# Patient Record
Sex: MALE | Race: WHITE | NOT HISPANIC OR LATINO
[De-identification: names, ages, dates, MRNs, and addresses within clinical notes are randomized per-mention and may not be internally consistent; named-entity substitution may affect disease eponyms.]

---

## 2020-08-31 ENCOUNTER — APPOINTMENT (OUTPATIENT)
Dept: OTOLARYNGOLOGY | Facility: CLINIC | Age: 75
End: 2020-08-31
Payer: MEDICARE

## 2020-08-31 VITALS — HEIGHT: 64 IN | WEIGHT: 202 LBS | BODY MASS INDEX: 34.49 KG/M2 | TEMPERATURE: 97.5 F

## 2020-08-31 DIAGNOSIS — Z86.69 PERSONAL HISTORY OF OTHER DISEASES OF THE NERVOUS SYSTEM AND SENSE ORGANS: ICD-10-CM

## 2020-08-31 DIAGNOSIS — Z78.9 OTHER SPECIFIED HEALTH STATUS: ICD-10-CM

## 2020-08-31 DIAGNOSIS — Z83.3 FAMILY HISTORY OF DIABETES MELLITUS: ICD-10-CM

## 2020-08-31 DIAGNOSIS — H90.5 UNSPECIFIED SENSORINEURAL HEARING LOSS: ICD-10-CM

## 2020-08-31 DIAGNOSIS — Z86.39 PERSONAL HISTORY OF OTHER ENDOCRINE, NUTRITIONAL AND METABOLIC DISEASE: ICD-10-CM

## 2020-08-31 DIAGNOSIS — H93.299 OTHER ABNORMAL AUDITORY PERCEPTIONS, UNSPECIFIED EAR: ICD-10-CM

## 2020-08-31 PROBLEM — Z00.00 ENCOUNTER FOR PREVENTIVE HEALTH EXAMINATION: Status: ACTIVE | Noted: 2020-08-31

## 2020-08-31 PROCEDURE — 99203 OFFICE O/P NEW LOW 30 MIN: CPT

## 2020-08-31 PROCEDURE — 92550 TYMPANOMETRY & REFLEX THRESH: CPT

## 2020-08-31 PROCEDURE — 92557 COMPREHENSIVE HEARING TEST: CPT

## 2020-08-31 NOTE — ASSESSMENT
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 He is medically cleared for amplification. He will have a consultation at our hearing Center.

## 2020-08-31 NOTE — HISTORY OF PRESENT ILLNESS
[de-identified] : Initial visit.\par He presents today for evaluation of a several year history of bilateral gradual hearing loss. His intermittent tinnitus. He does not have a history of significant or chronic ear problems.

## 2020-09-04 ENCOUNTER — APPOINTMENT (OUTPATIENT)
Dept: OTOLARYNGOLOGY | Facility: CLINIC | Age: 75
End: 2020-09-04
Payer: SELF-PAY

## 2020-09-04 PROCEDURE — V5010 ASSESSMENT FOR HEARING AID: CPT | Mod: NC

## 2020-09-13 ENCOUNTER — TRANSCRIPTION ENCOUNTER (OUTPATIENT)
Age: 75
End: 2020-09-13

## 2021-01-11 ENCOUNTER — APPOINTMENT (OUTPATIENT)
Dept: ORTHOPEDIC SURGERY | Facility: CLINIC | Age: 76
End: 2021-01-11
Payer: MEDICARE

## 2021-01-11 VITALS — RESPIRATION RATE: 16 BRPM | WEIGHT: 202 LBS | HEIGHT: 64 IN | BODY MASS INDEX: 34.49 KG/M2

## 2021-01-11 PROCEDURE — 76882 US LMTD JT/FCL EVL NVASC XTR: CPT | Mod: RT

## 2021-01-11 PROCEDURE — 99203 OFFICE O/P NEW LOW 30 MIN: CPT

## 2021-01-11 PROCEDURE — 73110 X-RAY EXAM OF WRIST: CPT | Mod: 50

## 2021-01-20 ENCOUNTER — EMERGENCY (EMERGENCY)
Facility: HOSPITAL | Age: 76
LOS: 1 days | Discharge: HOME | End: 2021-01-20
Admitting: EMERGENCY MEDICINE
Payer: MEDICARE

## 2021-01-20 VITALS
RESPIRATION RATE: 18 BRPM | SYSTOLIC BLOOD PRESSURE: 130 MMHG | TEMPERATURE: 98 F | HEART RATE: 93 BPM | DIASTOLIC BLOOD PRESSURE: 72 MMHG | OXYGEN SATURATION: 96 %

## 2021-01-20 DIAGNOSIS — Z20.822 CONTACT WITH AND (SUSPECTED) EXPOSURE TO COVID-19: ICD-10-CM

## 2021-01-20 DIAGNOSIS — Z90.49 ACQUIRED ABSENCE OF OTHER SPECIFIED PARTS OF DIGESTIVE TRACT: Chronic | ICD-10-CM

## 2021-01-20 PROCEDURE — 99283 EMERGENCY DEPT VISIT LOW MDM: CPT

## 2021-01-20 NOTE — ED PROVIDER NOTE - PHYSICAL EXAMINATION
Gen - WDWN, NAD, speaking in full sentences  Skin - no acute rash, intact   HEENT - AT/NC, no conjunctival injection or dc, neck supple and FROM, airway patent   CV - S1S2, R/R/R  Resp - CTAB, no focal r/r/w   MSK - w/w/p, full ROM of peripheral joints, no midline tenderness   Psych - euphoria, normal speech and eye contact, judgement/insight intact   Neuro - AxOx3, ambulatory with steady gait Gen - WDWN M, NAD, speaking in full sentences  Skin - no acute rash, intact   HEENT - AT/NC, no conjunctival injection or dc, neck supple and FROM, airway patent   CV - S1S2, R/R/R  Resp - CTAB, no focal r/r/w   MSK - w/w/p, full ROM of peripheral joints, no midline tenderness   Psych - euphoria, normal speech and eye contact, judgement/insight intact   Neuro - AxOx3, ambulatory with steady gait

## 2021-01-20 NOTE — ED PROVIDER NOTE - NS ED ROS FT
GEN - no fever, chills, malaise, weight loss   Skin - no rash, lesions, itch  HEENT - no rhinorrhea, congestion, change in vision/hearing/smell, sore throat, change in voice, neck pain  CV - no chest pain, palpitations, syncope  Resp - no cough, SOB, GODDARD, wheezing, hemoptysis  GI - no N/V/D/C  MSK - no swelling, joint pain, swelling, myalgia  Hem - No easy bruising or bleeding  Neuro - no HA, dizziness, loss of smell/taste, focal weakness, paresthesia  Psych - no insomonia or mood changes

## 2021-01-20 NOTE — ED PROVIDER NOTE - OBJECTIVE STATEMENT
74 yo M with PMHx of HTN, presenting for COVID screening.  Reports no active sx but son in law tested positive for COVID yesterday.  Denies fever, chills, cough, SOB, palpitations, HA, dizziness, congestion, rhinorrhea, N/V/D/C, abdomina pain, change in urinary/bowel function, focal weakness, and malaise. No recent travel noted 74 yo M with PMHx of HTN, DM, presenting for COVID screening.  Reports no active sx but son in law tested positive for COVID yesterday.  Denies fever, chills, cough, SOB, palpitations, HA, dizziness, congestion, rhinorrhea, N/V/D/C, abdomina pain, change in urinary/bowel function, focal weakness, and malaise. No recent travel noted

## 2021-01-20 NOTE — ED PROVIDER NOTE - PATIENT PORTAL LINK FT
You can access the FollowMyHealth Patient Portal offered by [facility] by registering at the following website: [email or web address]. By joining [employer]’s FollowMyHealth portal, you will also be able to view your health information using other applications (apps) compatible with our system.

## 2021-01-22 ENCOUNTER — APPOINTMENT (OUTPATIENT)
Dept: PULMONOLOGY | Facility: CLINIC | Age: 76
End: 2021-01-22
Payer: MEDICARE

## 2021-01-22 ENCOUNTER — APPOINTMENT (OUTPATIENT)
Dept: PULMONOLOGY | Facility: CLINIC | Age: 76
End: 2021-01-22

## 2021-01-22 PROBLEM — E11.9 TYPE 2 DIABETES MELLITUS WITHOUT COMPLICATIONS: Chronic | Status: ACTIVE | Noted: 2021-01-20

## 2021-01-22 PROBLEM — I10 ESSENTIAL (PRIMARY) HYPERTENSION: Chronic | Status: ACTIVE | Noted: 2021-01-20

## 2021-01-22 PROBLEM — E78.5 HYPERLIPIDEMIA, UNSPECIFIED: Chronic | Status: ACTIVE | Noted: 2021-01-20

## 2021-01-22 PROCEDURE — 99443: CPT | Mod: CS,95

## 2021-01-22 NOTE — HISTORY OF PRESENT ILLNESS
[Home] : at home, [unfilled] , at the time of the visit. [Medical Office: ([facility])___] : at the medical office located in  [Verbal consent obtained from patient] : the patient, [unfilled] [FreeTextEntry1] : Initial telephonic visit, Covid.  Unfortunately patient was unable to figure out how to do the video\par \par 75-year-old male, history of diabetes, KEARA on CPAP.\par \par Currently Covid day 8 based from symptoms.  Symptom onset was last Friday.  Fatigue, body aches, cough.  Monday seems to have been his worst day.  He did not get tested until Wednesday, came back positive.\par \par Is already feeling significantly better.  He has a pulse ox which is 96% on room air, heart rate 85.  He has no shortness of breath, no dyspnea with exertion.  Using his CPAP at night as always.  He still has some nasal congestion with a postnasal drip cough.  No fevers.  "Too good"\par \par taking a full dose aspirin now (instead of baby), D3, zinc, vit c\par \par on the phone, nasal congestion but no distress\par \par COVID day 8 (at least)\par Already clinically improving.\par At this point, no benefit from MAB referral.\par Continue to monitor sats, let us know if <92 - though i think he will be ok\par f/u over the weekend to chek in\par \par note, wife  of covid in the spring

## 2021-01-23 ENCOUNTER — NON-APPOINTMENT (OUTPATIENT)
Age: 76
End: 2021-01-23

## 2021-01-25 ENCOUNTER — APPOINTMENT (OUTPATIENT)
Dept: OPHTHALMOLOGY | Facility: CLINIC | Age: 76
End: 2021-01-25

## 2021-01-27 ENCOUNTER — APPOINTMENT (OUTPATIENT)
Dept: PULMONOLOGY | Facility: CLINIC | Age: 76
End: 2021-01-27
Payer: MEDICARE

## 2021-01-27 PROCEDURE — 99441: CPT | Mod: CS,95

## 2021-01-27 NOTE — HISTORY OF PRESENT ILLNESS
[Home] : at home, [unfilled] , at the time of the visit. [Medical Office: ([facility])___] : at the medical office located in  [Verbal consent obtained from patient] : the patient, [unfilled] [FreeTextEntry1] : Telephonic follow-up, Covid.\par \par Patient reports he continues to feel well.  He has normal oxygen saturations, afebrile, appetite back to normal.  Feeling well.\par \par Patient is inquiring about when he can get the vaccination.  Also if he is still contagious.\par \par Patient was advised that he has been more than 10 days since his onset of symptoms and he has no fevers or active symptoms, therefore he is no longer considered contagious.  As far as the vaccination, he is technically allowed to get it once he is no longer in isolation, however, I am recommending that patient wait closer to 90 days given the very low risk of reinfection within the first 90 days at this point.  I advised him to consider scheduling for around March

## 2021-03-31 ENCOUNTER — APPOINTMENT (OUTPATIENT)
Dept: OPHTHALMOLOGY | Facility: CLINIC | Age: 76
End: 2021-03-31

## 2021-03-31 ENCOUNTER — NON-APPOINTMENT (OUTPATIENT)
Age: 76
End: 2021-03-31

## 2021-04-01 ENCOUNTER — APPOINTMENT (OUTPATIENT)
Dept: ORTHOPEDIC SURGERY | Facility: CLINIC | Age: 76
End: 2021-04-01
Payer: MEDICARE

## 2021-04-01 VITALS — RESPIRATION RATE: 16 BRPM | BODY MASS INDEX: 34.49 KG/M2 | HEIGHT: 64 IN | WEIGHT: 202 LBS

## 2021-04-01 DIAGNOSIS — R22.31 LOCALIZED SWELLING, MASS AND LUMP, RIGHT UPPER LIMB: ICD-10-CM

## 2021-04-01 DIAGNOSIS — M25.531 PAIN IN RIGHT WRIST: ICD-10-CM

## 2021-04-01 PROCEDURE — 99214 OFFICE O/P EST MOD 30 MIN: CPT | Mod: 25

## 2021-04-01 PROCEDURE — 20612 ASPIRATE/INJ GANGLION CYST: CPT | Mod: RT

## 2021-07-27 ENCOUNTER — RESULT REVIEW (OUTPATIENT)
Age: 76
End: 2021-07-27

## 2021-07-27 ENCOUNTER — APPOINTMENT (OUTPATIENT)
Dept: RADIOLOGY | Facility: CLINIC | Age: 76
End: 2021-07-27

## 2021-07-27 ENCOUNTER — APPOINTMENT (OUTPATIENT)
Dept: PULMONOLOGY | Facility: CLINIC | Age: 76
End: 2021-07-27
Payer: MEDICARE

## 2021-07-27 ENCOUNTER — OUTPATIENT (OUTPATIENT)
Dept: OUTPATIENT SERVICES | Facility: HOSPITAL | Age: 76
LOS: 1 days | End: 2021-07-27
Payer: MEDICARE

## 2021-07-27 VITALS
DIASTOLIC BLOOD PRESSURE: 75 MMHG | TEMPERATURE: 98.8 F | BODY MASS INDEX: 34.15 KG/M2 | WEIGHT: 200 LBS | HEART RATE: 98 BPM | OXYGEN SATURATION: 95 % | HEIGHT: 64 IN | SYSTOLIC BLOOD PRESSURE: 119 MMHG

## 2021-07-27 DIAGNOSIS — Z90.49 ACQUIRED ABSENCE OF OTHER SPECIFIED PARTS OF DIGESTIVE TRACT: Chronic | ICD-10-CM

## 2021-07-27 PROCEDURE — 99204 OFFICE O/P NEW MOD 45 MIN: CPT

## 2021-07-27 PROCEDURE — 71046 X-RAY EXAM CHEST 2 VIEWS: CPT | Mod: 26

## 2021-07-28 NOTE — REVIEW OF SYSTEMS
[Fever] : no fever [Chills] : no chills [Dry Eyes] : no dry eyes [Eye Irritation] : no eye irritation [Nasal Congestion] : nasal congestion [Cough] : cough [Sputum] : no sputum [A.M. Dry Mouth] : no a.m. dry mouth [Chest Discomfort] : no chest discomfort [GERD] : no gerd [TextBox_148] : rest of ROS negative except in HPI

## 2021-07-28 NOTE — DISCUSSION/SUMMARY
[FreeTextEntry1] : 75 year old male with h/o KEARA on CPAP with h/o COVID in 2020 came to clinic with occasional cough.\par \par Review:\par Pulmonary note from past\par \par Plan:\par Cough due to post nasal drip from allergic rhinitis. Flonase nasal spray twice daily. CXR ordered. Patient will follow up after 4-6 weeks of using Flonase nasal spray.

## 2021-07-28 NOTE — HISTORY OF PRESENT ILLNESS
[TextBox_4] : 75 year old male with h/o KEARA on CPAP with h/o COVID in 2020 came to clinic with occasional cough. Cough is dry with occasional throat clearing, no diurnal variation, no relieving or exacerbating factor. Patient c/o occasional nasal congestion. Denies symptoms of GERD. Patient denies chest pain or SOB.

## 2021-07-28 NOTE — PHYSICAL EXAM
[No Acute Distress] : no acute distress [Well Nourished] : well nourished [Normal Oropharynx] : normal oropharynx [II] : Mallampati Class: II [Normal Appearance] : normal appearance [No Neck Mass] : no neck mass [Normal Rate/Rhythm] : normal rate/rhythm [Normal S1, S2] : normal s1, s2 [No Resp Distress] : no resp distress [Clear to Auscultation Bilaterally] : clear to auscultation bilaterally [Normal Gait] : normal gait [No Clubbing] : no clubbing [No Edema] : no edema [Normal Color/ Pigmentation] : normal color/ pigmentation [No Rash] : no rash [No Focal Deficits] : no focal deficits [No Sensory Deficits] : no sensory deficits [Oriented x3] : oriented x3 [Normal Affect] : normal affect

## 2021-09-13 ENCOUNTER — APPOINTMENT (OUTPATIENT)
Dept: PULMONOLOGY | Facility: CLINIC | Age: 76
End: 2021-09-13
Payer: MEDICARE

## 2021-09-13 VITALS
DIASTOLIC BLOOD PRESSURE: 77 MMHG | HEIGHT: 64 IN | SYSTOLIC BLOOD PRESSURE: 120 MMHG | HEART RATE: 105 BPM | WEIGHT: 193 LBS | BODY MASS INDEX: 32.95 KG/M2 | OXYGEN SATURATION: 96 % | TEMPERATURE: 98.5 F

## 2021-09-13 DIAGNOSIS — R05 COUGH: ICD-10-CM

## 2021-09-13 DIAGNOSIS — J30.1 ALLERGIC RHINITIS DUE TO POLLEN: ICD-10-CM

## 2021-09-13 DIAGNOSIS — Z11.59 ENCOUNTER FOR SCREENING FOR OTHER VIRAL DISEASES: ICD-10-CM

## 2021-09-13 PROCEDURE — 99214 OFFICE O/P EST MOD 30 MIN: CPT

## 2021-09-13 NOTE — DISCUSSION/SUMMARY
[FreeTextEntry1] : 75 year old male with h/o KEARA on CPAP with h/o COVID in 2020 came to clinic with occasional cough due to allergic rhinitis (resolved)\par \par Review:\par Pulmonary note from past\par \par Plan:\par Cough was due to post nasal drip from allergic rhinitis. Resolved with use of Flonase nasal spray twice daily. CXR was done during last visit and it was normal. Rest as above.

## 2021-09-13 NOTE — HISTORY OF PRESENT ILLNESS
[TextBox_4] : 75 year old male with h/o KEARA on CPAP with h/o COVID in 2020 came to clinic with occasional cough. Cough is dry with occasional throat clearing, no diurnal variation, no relieving or exacerbating factor. Patient c/o occasional nasal congestion. Denies symptoms of GERD. Patient denies chest pain or SOB. \par \par 9/13/21:\par Cough resolved. Patient is still using flonase nasal spray.

## 2021-09-13 NOTE — REVIEW OF SYSTEMS
[Fever] : no fever [Chills] : no chills [Dry Eyes] : no dry eyes [Eye Irritation] : no eye irritation [Nasal Congestion] : no nasal congestion [Cough] : no cough [Sputum] : no sputum [A.M. Dry Mouth] : no a.m. dry mouth [Chest Discomfort] : no chest discomfort [GERD] : no gerd [TextBox_148] : rest of ROS negative except in HPI

## 2021-09-16 LAB — SARS-COV-2 N GENE NPH QL NAA+PROBE: NOT DETECTED

## 2022-01-24 ENCOUNTER — TRANSCRIPTION ENCOUNTER (OUTPATIENT)
Age: 77
End: 2022-01-24

## 2022-01-31 ENCOUNTER — RX RENEWAL (OUTPATIENT)
Age: 77
End: 2022-01-31

## 2022-04-11 PROBLEM — Z11.59 SCREENING FOR VIRAL DISEASE: Status: ACTIVE | Noted: 2021-09-13

## 2022-05-11 ENCOUNTER — NON-APPOINTMENT (OUTPATIENT)
Age: 77
End: 2022-05-11

## 2022-06-07 ENCOUNTER — APPOINTMENT (OUTPATIENT)
Dept: ORTHOPEDIC SURGERY | Facility: CLINIC | Age: 77
End: 2022-06-07
Payer: MEDICARE

## 2022-06-07 VITALS
BODY MASS INDEX: 32.95 KG/M2 | HEART RATE: 101 BPM | DIASTOLIC BLOOD PRESSURE: 82 MMHG | HEIGHT: 64 IN | WEIGHT: 193 LBS | OXYGEN SATURATION: 97 % | SYSTOLIC BLOOD PRESSURE: 128 MMHG

## 2022-06-07 DIAGNOSIS — M47.816 SPONDYLOSIS W/OUT MYELOPATHY OR RADICULOPATHY, LUMBAR REGION: ICD-10-CM

## 2022-06-07 PROCEDURE — 99204 OFFICE O/P NEW MOD 45 MIN: CPT

## 2022-06-07 PROCEDURE — 72083 X-RAY EXAM ENTIRE SPI 4/5 VW: CPT

## 2022-06-27 PROBLEM — M47.816 LUMBAR SPONDYLOSIS: Status: ACTIVE | Noted: 2022-06-27

## 2022-06-27 NOTE — ADDENDUM
[FreeTextEntry1] : I, Elzbieta Dickey, assisted with documentation on 06/07/2022 acting as scribe for Dr. Marlo Estrella.

## 2022-06-27 NOTE — PHYSICAL EXAM
[de-identified] : General: patient is well developed, well nourished, in no acute \par distress, alert and oriented x 3. \par \par Mood and affect: normal\par \par Respiratory: no respiratory distress noted\par \par Skin: no scars over spine, skin intact, no erythema, increased warmth\par \par Alignment:The spine is well compensated in the coronal and sagittal plane. \par \par Gait: The patient is able to toe walk and heel walk without difficulty. \par \par Palpation: no tenderness to palpation spine or paraspinal region\par \par Range of motion: Lumbar spine ROM is restricted\par \par Neurologic Exam:\par Motor: Manual Muscle testing in the lower extremities is 5 out of 5 in all muscle groups. There is no evidence of muscular atrophy in the lower extremities. Sensory: Sensation to light touch is grossly intact in the lower extremities\par \par Hip Exam: No pain with internal or external rotation of hips bilaterally\par \par Special tests: Straight leg raise test negative. Cross straight leg test negative.  [de-identified] : XR Full Spine with Lumbar flexion/extension 6/7/22 [my read]: there is multilevel disc degeneration and lumbar spondylosis. No fractures seen. No significant scoliosis and appropriate sagittal alignment. No spondylolisthesis seen.

## 2022-06-27 NOTE — HISTORY OF PRESENT ILLNESS
[de-identified] : Initial 6/7/22: This is a 76 year old gentleman who reports low back pain for approximately the past five months, atraumatic onset. Pain localized to his right lumbosacral region. Denies lower extremity radiating pain tonight. Denies lower extremity numbness, weakness, paresthesias. Pain is persistent with periodic exacerbations of pain. Last exacerbation was approximately two weeks ago when he was on a long flight to [place]. Pain resolved with oral steroids.

## 2022-06-27 NOTE — DISCUSSION/SUMMARY
[de-identified] : Diagnosis: lower back pain lumbar disc degeneration lumbar spondylosis.\par \par Patient was given a prescription of Etodolac to take for one month twice a day with food. He was also given a prescription for a muscle relaxant as needed. He was given a prescription for physical therapy. I discussed with him that I would like to see him back in three months if needed.  Detail Level: Generalized X Size Of Lesion In Cm (Optional): 0

## 2022-07-06 ENCOUNTER — RX RENEWAL (OUTPATIENT)
Age: 77
End: 2022-07-06

## 2022-07-21 ENCOUNTER — NON-APPOINTMENT (OUTPATIENT)
Age: 77
End: 2022-07-21

## 2022-09-13 ENCOUNTER — APPOINTMENT (OUTPATIENT)
Dept: ORTHOPEDIC SURGERY | Facility: CLINIC | Age: 77
End: 2022-09-13

## 2022-09-13 DIAGNOSIS — M51.36 OTHER INTERVERTEBRAL DISC DEGENERATION, LUMBAR REGION: ICD-10-CM

## 2022-09-13 DIAGNOSIS — G89.29 LOW BACK PAIN, UNSPECIFIED: ICD-10-CM

## 2022-09-13 DIAGNOSIS — M54.50 LOW BACK PAIN, UNSPECIFIED: ICD-10-CM

## 2022-09-13 PROCEDURE — 99213 OFFICE O/P EST LOW 20 MIN: CPT

## 2022-09-23 PROBLEM — M51.36 DISC DEGENERATION, LUMBAR: Status: ACTIVE | Noted: 2022-06-27

## 2022-09-23 PROBLEM — M54.50 CHRONIC RIGHT-SIDED LOW BACK PAIN WITHOUT SCIATICA: Status: ACTIVE | Noted: 2022-06-07

## 2022-09-27 NOTE — HISTORY OF PRESENT ILLNESS
[de-identified] : Follow up 9/13/22: Patient presents for follow up. He states he took etodolac and cyclobenzaprine as prescribed and reports significant improvement, almost coplete resolution of his low back pain since last visit. Denies lower extremity radiating pain, denies lower extremity numbness/weakness/paresthesias. Patient states is doing very well. \par \par Initial 6/7/22: This is a 76 year old gentleman who reports low back pain for approximately the past five months, atraumatic onset. Pain localized to his right lumbosacral region. Denies lower extremity radiating pain tonight. Denies lower extremity numbness, weakness, paresthesias. Pain is persistent with periodic exacerbations of pain. Last exacerbation was approximately two weeks ago when he was on a long flight to [place]. Pain resolved with oral steroids. \par

## 2022-09-27 NOTE — DISCUSSION/SUMMARY
[de-identified] : Diagnosis: chronic low back pain, lumbar disc degeneration, lumbar spondylosis\par \par Discussed my findings with the patient. As patient is neurologically intact and symptoms have almost completely resolved, no indication for further work up with advanced imaging or treatment at this time. Patient can take oral NSAIDs as needed. He will follow up with me on an as needed basis. All questions answered.

## 2022-09-27 NOTE — PHYSICAL EXAM
[de-identified] : General: patient is well developed, well nourished, in no acute \par distress, alert and oriented x 3. \par \par Mood and affect: normal\par \par Respiratory: no respiratory distress noted\par \par Skin: no scars over spine, skin intact, no erythema, increased warmth\par \par Alignment:The spine is well compensated in the coronal and sagittal plane. \par \par Gait: The patient is able to toe walk and heel walk without difficulty. \par \par Palpation: no tenderness to palpation spine or paraspinal region\par \par Range of motion: Lumbar spine ROM is restricted\par \par Neurologic Exam:\par Motor: Manual Muscle testing in the lower extremities is 5 out of 5 in all muscle groups. There is no evidence of muscular atrophy in the lower extremities. Sensory: Sensation to light touch is grossly intact in the lower extremities\par \par Hip Exam: No pain with internal or external rotation of hips bilaterally\par \par Special tests: Straight leg raise test negative. Cross straight leg test negative.  [de-identified] : XR Full Spine with Lumbar flexion/extension 6/7/22 [my read]: there is multilevel disc degeneration and lumbar spondylosis. No fractures seen. No significant scoliosis and appropriate sagittal alignment. No spondylolisthesis seen. \par \par

## 2022-09-30 ENCOUNTER — RX RENEWAL (OUTPATIENT)
Age: 77
End: 2022-09-30

## 2022-11-13 ENCOUNTER — NON-APPOINTMENT (OUTPATIENT)
Age: 77
End: 2022-11-13

## 2022-11-15 ENCOUNTER — NON-APPOINTMENT (OUTPATIENT)
Age: 77
End: 2022-11-15

## 2022-11-15 ENCOUNTER — APPOINTMENT (OUTPATIENT)
Dept: OPHTHALMOLOGY | Facility: CLINIC | Age: 77
End: 2022-11-15

## 2022-12-22 ENCOUNTER — NON-APPOINTMENT (OUTPATIENT)
Age: 77
End: 2022-12-22

## 2023-01-19 ENCOUNTER — NON-APPOINTMENT (OUTPATIENT)
Age: 78
End: 2023-01-19

## 2023-02-23 ENCOUNTER — NON-APPOINTMENT (OUTPATIENT)
Age: 78
End: 2023-02-23

## 2023-04-06 ENCOUNTER — RX RENEWAL (OUTPATIENT)
Age: 78
End: 2023-04-06

## 2023-06-21 ENCOUNTER — APPOINTMENT (OUTPATIENT)
Dept: OPHTHALMOLOGY | Facility: CLINIC | Age: 78
End: 2023-06-21

## 2023-06-21 ENCOUNTER — NON-APPOINTMENT (OUTPATIENT)
Age: 78
End: 2023-06-21

## 2023-11-09 RX ORDER — FLUTICASONE PROPIONATE 50 UG/1
50 SPRAY, METERED NASAL
Qty: 16 | Refills: 5 | Status: ACTIVE | COMMUNITY
Start: 2021-07-27 | End: 1900-01-01

## 2023-12-21 ENCOUNTER — NON-APPOINTMENT (OUTPATIENT)
Age: 78
End: 2023-12-21

## 2023-12-21 ENCOUNTER — APPOINTMENT (OUTPATIENT)
Dept: OPHTHALMOLOGY | Facility: CLINIC | Age: 78
End: 2023-12-21

## 2024-02-05 ENCOUNTER — APPOINTMENT (OUTPATIENT)
Dept: PULMONOLOGY | Facility: CLINIC | Age: 79
End: 2024-02-05
Payer: MEDICARE

## 2024-02-05 VITALS
WEIGHT: 200 LBS | DIASTOLIC BLOOD PRESSURE: 78 MMHG | HEIGHT: 64 IN | SYSTOLIC BLOOD PRESSURE: 144 MMHG | OXYGEN SATURATION: 96 % | BODY MASS INDEX: 34.15 KG/M2 | TEMPERATURE: 97.7 F | HEART RATE: 87 BPM

## 2024-02-05 DIAGNOSIS — R06.2 WHEEZING: ICD-10-CM

## 2024-02-05 DIAGNOSIS — G47.33 OBSTRUCTIVE SLEEP APNEA (ADULT) (PEDIATRIC): ICD-10-CM

## 2024-02-05 PROCEDURE — 99203 OFFICE O/P NEW LOW 30 MIN: CPT | Mod: 25

## 2024-02-05 PROCEDURE — 94618 PULMONARY STRESS TESTING: CPT

## 2024-02-05 PROCEDURE — ZZZZZ: CPT

## 2024-02-05 PROCEDURE — 94729 DIFFUSING CAPACITY: CPT

## 2024-02-05 PROCEDURE — 94726 PLETHYSMOGRAPHY LUNG VOLUMES: CPT

## 2024-02-05 PROCEDURE — 94060 EVALUATION OF WHEEZING: CPT

## 2024-02-05 NOTE — PHYSICAL EXAM
[No Acute Distress] : no acute distress [No Resp Distress] : no resp distress [Clear to Auscultation Bilaterally] : clear to auscultation bilaterally [Normal Gait] : normal gait [Oriented x3] : oriented x3 [Normal Affect] : normal affect [Normal Appearance] : normal appearance [Normal Rate/Rhythm] : normal rate/rhythm [No Acc Muscle Use] : no acc muscle use

## 2024-02-05 NOTE — HISTORY OF PRESENT ILLNESS
[Former] : former [>= 20 pack years] : >= 20 pack years [Never] : never [TextBox_4] : 75 year old male with h/o KEARA on CPAP with h/o COVID in 2020 came to clinic with occasional cough. Cough is dry with occasional throat clearing, no diurnal variation, no relieving or exacerbating factor. Patient c/o occasional nasal congestion. Denies symptoms of GERD. Patient denies chest pain or SOB.  9/13/21: Cough resolved. Patient is still using flonase nasal spray.  2/5/2024 Cough not an issue. Does have some wheezing. Feels chest tightness. Unclear when present. Smoked at least 20 pack years but quit in the 1980s. Does have KEARA and uses PAP intermittently.  [YearQuit] : 1980s [ESS] : 3

## 2024-02-05 NOTE — ASSESSMENT
[FreeTextEntry1] : Review: Pulmonary note from past PFTs 2/5/2024: FEV1/FVC 67, FEV1 70, TLCO 71, DLCO 70 6MWT 2/5/2024: no desat; 457 meters walked  78 year old male with h/o KEARA on CPAP with h/o COVID in 2020 came to clinic with occasional cough due to allergic rhinitis (resolved) here for wheezing. Unclear etiology of wheezing. Does have hx of smoking. Endorses significant chest pressure as well. PFTs today with restriction primarily, mild obstruction.   Plan: -Trial LAMA/LABA -Encouraged weight loss -Cardiology evaluation for significant chest tightness -Send me PAP info so I can link accounts -Follow up in 2 months

## 2024-02-05 NOTE — CONSULT LETTER
[Dear  ___] : Dear  [unfilled], [Consult Letter:] : I had the pleasure of evaluating your patient, [unfilled]. [Please see my note below.] : Please see my note below. [Consult Closing:] : Thank you very much for allowing me to participate in the care of this patient.  If you have any questions, please do not hesitate to contact me. [Sincerely,] : Sincerely, [FreeTextEntry3] : Betty Smiley MD  [employer] [facility] Pulmonary, Critical Care, and Sleep Medicine

## 2024-02-06 DIAGNOSIS — J44.9 CHRONIC OBSTRUCTIVE PULMONARY DISEASE, UNSPECIFIED: ICD-10-CM

## 2024-02-14 ENCOUNTER — NON-APPOINTMENT (OUTPATIENT)
Age: 79
End: 2024-02-14

## 2024-02-14 ENCOUNTER — APPOINTMENT (OUTPATIENT)
Dept: HEART AND VASCULAR | Facility: CLINIC | Age: 79
End: 2024-02-14
Payer: MEDICARE

## 2024-02-14 VITALS
DIASTOLIC BLOOD PRESSURE: 81 MMHG | WEIGHT: 200.38 LBS | HEART RATE: 100 BPM | SYSTOLIC BLOOD PRESSURE: 145 MMHG | HEIGHT: 64 IN | TEMPERATURE: 98.2 F | BODY MASS INDEX: 34.21 KG/M2 | OXYGEN SATURATION: 96 %

## 2024-02-14 DIAGNOSIS — R07.89 OTHER CHEST PAIN: ICD-10-CM

## 2024-02-14 PROCEDURE — G2211 COMPLEX E/M VISIT ADD ON: CPT

## 2024-02-14 PROCEDURE — 99203 OFFICE O/P NEW LOW 30 MIN: CPT

## 2024-02-14 PROCEDURE — 93000 ELECTROCARDIOGRAM COMPLETE: CPT

## 2024-02-14 NOTE — REASON FOR VISIT
Quality 110: Preventive Care And Screening: Influenza Immunization: Influenza Immunization Administered during Influenza season Detail Level: Detailed Quality 226: Preventive Care And Screening: Tobacco Use: Screening And Cessation Intervention: Patient screened for tobacco use and is an ex/non-smoker Quality 130: Documentation Of Current Medications In The Medical Record: Current Medications Documented [Symptom and Test Evaluation] : symptom and test evaluation [FreeTextEntry1] :   CV Data: ECG 2/14/24: sinus tachy, inferior Q waves

## 2024-02-14 NOTE — HISTORY OF PRESENT ILLNESS
[FreeTextEntry1] : 78M w KEAAR, HTN, preDMT2, BPH  - on olmesartan, farxiga, asa 81 (rx by pcp) - anoro recently prescribed - does report chest pressures but still has good exercise capacity  - walked a mile to office today, no issues

## 2024-02-15 DIAGNOSIS — R79.89 OTHER SPECIFIED ABNORMAL FINDINGS OF BLOOD CHEMISTRY: ICD-10-CM

## 2024-02-15 LAB
ALBUMIN SERPL ELPH-MCNC: 4.5 G/DL
ALP BLD-CCNC: 81 U/L
ALT SERPL-CCNC: 13 U/L
ANION GAP SERPL CALC-SCNC: 14 MMOL/L
AST SERPL-CCNC: 12 U/L
BILIRUB SERPL-MCNC: 0.6 MG/DL
BUN SERPL-MCNC: 30 MG/DL
CALCIUM SERPL-MCNC: 8.9 MG/DL
CHLORIDE SERPL-SCNC: 102 MMOL/L
CHOLEST SERPL-MCNC: 191 MG/DL
CO2 SERPL-SCNC: 21 MMOL/L
CREAT SERPL-MCNC: 1.45 MG/DL
EGFR: 50 ML/MIN/1.73M2
GLUCOSE SERPL-MCNC: 441 MG/DL
HCT VFR BLD CALC: 44 %
HDLC SERPL-MCNC: 52 MG/DL
HGB BLD-MCNC: 13.9 G/DL
LDLC SERPL CALC-MCNC: 106 MG/DL
MCHC RBC-ENTMCNC: 28.2 PG
MCHC RBC-ENTMCNC: 31.6 GM/DL
MCV RBC AUTO: 89.2 FL
NONHDLC SERPL-MCNC: 140 MG/DL
PLATELET # BLD AUTO: 135 K/UL
POTASSIUM SERPL-SCNC: 4.8 MMOL/L
PROT SERPL-MCNC: 7.2 G/DL
RBC # BLD: 4.93 M/UL
RBC # FLD: 13.2 %
SODIUM SERPL-SCNC: 137 MMOL/L
TRIGL SERPL-MCNC: 193 MG/DL
WBC # FLD AUTO: 5.21 K/UL

## 2024-02-21 ENCOUNTER — APPOINTMENT (OUTPATIENT)
Dept: HEART AND VASCULAR | Facility: CLINIC | Age: 79
End: 2024-02-21
Payer: MEDICARE

## 2024-02-21 PROCEDURE — 36415 COLL VENOUS BLD VENIPUNCTURE: CPT

## 2024-02-22 LAB
ANION GAP SERPL CALC-SCNC: 18 MMOL/L
BUN SERPL-MCNC: 18 MG/DL
CALCIUM SERPL-MCNC: 9.7 MG/DL
CHLORIDE SERPL-SCNC: 102 MMOL/L
CO2 SERPL-SCNC: 18 MMOL/L
CREAT SERPL-MCNC: 1.21 MG/DL
EGFR: 62 ML/MIN/1.73M2
GLUCOSE SERPL-MCNC: 219 MG/DL
POTASSIUM SERPL-SCNC: 4.4 MMOL/L
SODIUM SERPL-SCNC: 139 MMOL/L

## 2024-02-26 ENCOUNTER — RESULT REVIEW (OUTPATIENT)
Age: 79
End: 2024-02-26

## 2024-02-26 ENCOUNTER — APPOINTMENT (OUTPATIENT)
Dept: CT IMAGING | Facility: CLINIC | Age: 79
End: 2024-02-26
Payer: MEDICARE

## 2024-02-26 ENCOUNTER — OUTPATIENT (OUTPATIENT)
Dept: OUTPATIENT SERVICES | Facility: HOSPITAL | Age: 79
LOS: 1 days | End: 2024-02-26

## 2024-02-26 DIAGNOSIS — Z90.49 ACQUIRED ABSENCE OF OTHER SPECIFIED PARTS OF DIGESTIVE TRACT: Chronic | ICD-10-CM

## 2024-02-26 PROCEDURE — 75574 CT ANGIO HRT W/3D IMAGE: CPT | Mod: 26,MH

## 2024-02-26 PROCEDURE — 75580 N-INVAS EST C FFR SW ALY CTA: CPT | Mod: 26

## 2024-03-04 ENCOUNTER — RX RENEWAL (OUTPATIENT)
Age: 79
End: 2024-03-04

## 2024-03-08 ENCOUNTER — APPOINTMENT (OUTPATIENT)
Dept: HEART AND VASCULAR | Facility: CLINIC | Age: 79
End: 2024-03-08
Payer: MEDICARE

## 2024-03-08 VITALS
DIASTOLIC BLOOD PRESSURE: 87 MMHG | BODY MASS INDEX: 32.78 KG/M2 | OXYGEN SATURATION: 93 % | WEIGHT: 192 LBS | HEIGHT: 64 IN | SYSTOLIC BLOOD PRESSURE: 145 MMHG | TEMPERATURE: 97.6 F | HEART RATE: 100 BPM

## 2024-03-08 PROCEDURE — G2211 COMPLEX E/M VISIT ADD ON: CPT

## 2024-03-08 PROCEDURE — 99215 OFFICE O/P EST HI 40 MIN: CPT

## 2024-03-08 NOTE — HISTORY OF PRESENT ILLNESS
[FreeTextEntry1] : 78M w KEARA, HTN, preDMT2, BPH who was referred by Dr Smiley for chest tightness. Work up performed include a CCTA with significant CAD as noted above Today he follows up noting he has a chest cold. He is still able to function reasonably and reports not having any significant impairment in his day-to-day activities. - Clearly denies GODDARD, or exertional chest pain. - never been on statins before

## 2024-03-08 NOTE — PHYSICAL EXAM
[Well Nourished] : well nourished [Well Developed] : well developed [No Acute Distress] : no acute distress [Normal Conjunctiva] : normal conjunctiva [Normal Venous Pressure] : normal venous pressure [No Carotid Bruit] : no carotid bruit [Normal S1, S2] : normal S1, S2 [No Murmur] : no murmur [No Rub] : no rub [No Gallop] : no gallop [Good Air Entry] : good air entry [Clear Lung Fields] : clear lung fields [No Respiratory Distress] : no respiratory distress  [Soft] : abdomen soft [Non Tender] : non-tender [No Masses/organomegaly] : no masses/organomegaly [Normal Bowel Sounds] : normal bowel sounds [Normal Gait] : normal gait [No Edema] : no edema [No Cyanosis] : no cyanosis [No Varicosities] : no varicosities [No Clubbing] : no clubbing [No Rash] : no rash [No Skin Lesions] : no skin lesions [Moves all extremities] : moves all extremities [No Focal Deficits] : no focal deficits [Normal Speech] : normal speech [Alert and Oriented] : alert and oriented [Normal memory] : normal memory [de-identified] : tachycardic PAST MEDICAL HISTORY:  Asthma     Depression     Diabetes     H/O constipation     Hyperlipidemia

## 2024-03-08 NOTE — REASON FOR VISIT
[Symptom and Test Evaluation] : symptom and test evaluation [FreeTextEntry1] :   CV Data: ECG 2/14/24: sinus tachy, inferior Q waves  CCTA 2/26/24:  1.  The calcium score is severe at 988 Agatston units, which is at the 77 percentile, adjusted for age, gender and race. 2.  Severe stenosis of mid LAD, distal LAD, distal RCA, and RPL. 3.  Moderate to severe stenosis of proximal RCA. 4.  Moderate stenosis of proximal LCx, mid RCA and D1. 5.  Calcific plaque obscures the lumen of OM1, and RPDA, precluding stenosis evaluation. 6.  Remaining coronary segments are non-obstructive.  The results of the CT-FFR analysis are: LAD: 0.72 mid (3); 0.68 distal (3); 0.84 D1 (1) LCx: 0.91 proximal (1); OM1 is not modeled due to small caliber. RCA: 0.94 proximal (1); 0.94 mid (1); 0.94 distal (1); 0.68 RPL (3); RPDA is not modeled due to small caliber.

## 2024-03-08 NOTE — END OF VISIT
Follow-up with your primary care physician in one week if symptoms have not improved or symptoms are starting to get worse.  Take over-the-counter Flonase 1 spray in each nostril twice a day for the next 30 days.  Steam inhalation will be helpful, nightly humidifier will also be helpful.  Take Tylenol and Motrin for fever and pain.  Increase fluids, keep well-hydrated.  You can also use over-the-counter antihistamine allergy medicines this would include Zyrtec, Claritin, or Allegra.  Please choose 1 of these and take it daily.  Please take and finish the full course of the antibiotics.   [Time Spent: ___ minutes] : I have spent [unfilled] minutes of time on the encounter.

## 2024-03-11 LAB
APTT BLD: 28.9 SEC
INR PPP: 0.87 RATIO
PT BLD: 9.9 SEC

## 2024-03-12 ENCOUNTER — RX RENEWAL (OUTPATIENT)
Age: 79
End: 2024-03-12

## 2024-03-13 VITALS
HEIGHT: 64 IN | TEMPERATURE: 98 F | RESPIRATION RATE: 16 BRPM | WEIGHT: 190.04 LBS | DIASTOLIC BLOOD PRESSURE: 70 MMHG | HEART RATE: 87 BPM | SYSTOLIC BLOOD PRESSURE: 117 MMHG | OXYGEN SATURATION: 99 %

## 2024-03-13 RX ORDER — CHLORHEXIDINE GLUCONATE 213 G/1000ML
1 SOLUTION TOPICAL ONCE
Refills: 0 | Status: DISCONTINUED | OUTPATIENT
Start: 2024-03-18 | End: 2024-03-19

## 2024-03-13 NOTE — H&P ADULT - ASSESSMENT
77 yo M, former smoker (20 years), with PMHx HTN, HLD, DM2, CAD (per CCTA severe FFR+ LAD and RPL w/ borderline severe LCX FFR 0.91), CKD (outpatient sCr in allscripts 1.2), thrombocytopenia ( 2/2024) COPD, KEARA who in light of risk factors, symptoms consisting of chest tightness, and abnormal CCTA w/ FFR positive disease, pt now presents for cardiac catheterization with possible intervention if clinically indicated.     EKG: 			  ASA II	Mallampati class: II  Anginal Class: II     -No Known Allergies    -H/H = 15.0/44.8  -Pt denies BRBPR, hematuria, hematochezia, melena. Pt endorses compliance w/ home ASA, stating last dose was 3/17/24. Pt loaded w/ ASA 81 mg x1 and Plavix 600 mg x1  -BUN/Cr =   -EF uknonwn. Euvolemic on exam. IV NS @ 250cc bolus followed by 75 cc/hr x 2 hrs started pre procedure    Sedation Plan:   Moderate  Patient Is Suitable Candidate For Sedation?     Yes    Risks & benefits of procedure and alternative therapy have been explained to the patient by the Interventional Cardiology Fellow including but not limited to: allergic reaction, bleeding with possible need for blood transfusion, infection, renal and vascular compromise, limb damage, arrhythmia, stroke, vessel dissection/perforation, myocardial infarction, and emergent CABG. Informed consent obtained at bedside and included in chart.     79 yo M, former smoker (20 years), with PMHx HTN, HLD, DM2, CAD (per CCTA severe FFR+ LAD and RPL w/ borderline severe LCX FFR 0.91), CKD (outpatient sCr in allscripts 1.2), thrombocytopenia ( 2/2024) COPD, KEARA who in light of risk factors, symptoms consisting of chest tightness, and abnormal CCTA w/ FFR positive disease, pt now presents for cardiac catheterization with possible intervention if clinically indicated.     EKG: 			  ASA II	Mallampati class: II  Anginal Class: II     -No Known Allergies    -H/H = 15.0/44.8  -Pt denies BRBPR, hematuria, hematochezia, melena. Pt endorses compliance w/ home ASA, stating last dose was 3/17/24. Pt loaded w/ ASA 81 mg x1 and Plavix 600 mg x1  -BUN/Cr = 36/1.34   -EF uknonwn. Euvolemic on exam. IV NS @ 250cc bolus followed by 75 cc/hr x 2 hrs started pre procedure    Sedation Plan:   Moderate  Patient Is Suitable Candidate For Sedation?     Yes    Risks & benefits of procedure and alternative therapy have been explained to the patient by the Interventional Cardiology Fellow including but not limited to: allergic reaction, bleeding with possible need for blood transfusion, infection, renal and vascular compromise, limb damage, arrhythmia, stroke, vessel dissection/perforation, myocardial infarction, and emergent CABG. Informed consent obtained at bedside and included in chart.

## 2024-03-13 NOTE — H&P ADULT - NSHPLABSRESULTS_GEN_ALL_CORE
15.0   7.22  )-----------( 154      ( 18 Mar 2024 06:51 )             44.8       03-18    137  |  103  |  36<H>  ----------------------------<  290<H>  4.7   |  19<L>  |  1.34<H>    Ca    10.0      18 Mar 2024 06:51  Mg     2.6     03-18    TPro  7.9  /  Alb  4.9  /  TBili  0.6  /  DBili  x   /  AST  16  /  ALT  12  /  AlkPhos  87  03-18      PT/INR - ( 18 Mar 2024 06:51 )   PT: 10.6 sec;   INR: 0.93          PTT - ( 18 Mar 2024 06:51 )  PTT:28.5 sec    CARDIAC MARKERS ( 18 Mar 2024 06:51 )  x     / x     / 64 U/L / x     / 1.4 ng/mL        Urinalysis Basic - ( 18 Mar 2024 06:51 )    Color: x / Appearance: x / SG: x / pH: x  Gluc: 290 mg/dL / Ketone: x  / Bili: x / Urobili: x   Blood: x / Protein: x / Nitrite: x   Leuk Esterase: x / RBC: x / WBC x   Sq Epi: x / Non Sq Epi: x / Bacteria: x        EKG: NSR 87 BPM with TWI Lead III.

## 2024-03-13 NOTE — H&P ADULT - HISTORY OF PRESENT ILLNESS
Pharmacy: _______  Cardiologist: _______   Escort: _______    Ordered for ASA _____ mg / Plavix ______ mg, NS ______ ml bolus and ______ ml/hr x 2 hours maintenance    79 yo M, ______ smoker, with PMHx HTN, pre-DM2, CAD (per CCTA severe FFR+ LAD and RPL w/ bordeline severe LCX), CKD (baseline ____), COPD, KEARA, presented to Dr Murray with c/o _____. Patient otherwise denies ______ CP, SOB, dizziness, palpitations, orthopnea/PND, leg swelling, LOC, bleeding, melena/hematochezia, fever, chills, URI symptoms, or recent illness.     Past Testing:  -   -     In light of risk factors, CCS class ____ anginal / anginal-equivalent symptoms and abnormal ____, pt now presents for cardiac catheterization with possible intervention if clinically indicated.      Meds: ______________________  Pharmacy: _______  Cardiologist: Dr Murray  Escort: _______    Ordered for ASA 81mg / Plavix 600 mg,  ml bolus and 75 ml/hr x 2 hours maintenance    77 yo M, former smoker (20 years), with PMHx HTN, HLD, DM2, CAD (per CCTA severe FFR+ LAD and RPL w/ borderline severe LCX FFR 0.91), CKD (outpatient BMP in allscripts 1.2), thrombocytopenia ( 2/2024) COPD, KEARA, presented to Dr Murray with c/o chest tightness, however denies GODDARD or exertional CP as well as dizziness, palpitations, orthopnea/PND, leg swelling, LOC, bleeding, melena/hematochezia. He recently had a URI but is now asymptomatic _____.    Past Testing:  - CCTA 2/26/24: Ca score 998 (77%)m, w. FFR positive mLAD (0.72), dLAD (0.68), D1 (0.84), and RPL (0.68), w/ remaining segments FFR negative (pLCX 0.91, p-dRCA 0.94, RPDA and OM1 not visualized as small caliber vessels).    In light of risk factors, symptoms consisting of chest tightness, and abnormal CCTA w/ FFR positive disease, pt now presents for cardiac catheterization with possible intervention if clinically indicated.   Pharmacy: [facility] ([phone])   Cardiologist: Dr Murray  Escort: Daughter       77 yo M, former smoker (20 years), with PMHx HTN, HLD, DM2, CAD (per CCTA severe FFR+ LAD and RPL w/ borderline severe LCX FFR 0.91), CKD (outpatient sCr in allscripts 1.2), thrombocytopenia ( 2/2024) COPD, KEARA, presented to Dr Murray with c/o chest tightness, however denies GODDARD or exertional CP as well as dizziness, palpitations, orthopnea/PND, leg swelling, LOC, bleeding, melena/hematochezia. He recently had a URI but is now asymptomatic.    Past Testing:  - CCTA 2/26/24: Ca score 998 (77%)m, w. FFR positive mLAD (0.72), dLAD (0.68), D1 (0.84), and RPL (0.68), w/ remaining segments FFR negative (pLCX 0.91, p-dRCA 0.94, RPDA and OM1 not visualized as small caliber vessels).    In light of risk factors, symptoms consisting of chest tightness, and abnormal CCTA w/ FFR positive disease, pt now presents for cardiac catheterization with possible intervention if clinically indicated.

## 2024-03-18 ENCOUNTER — INPATIENT (INPATIENT)
Facility: HOSPITAL | Age: 79
LOS: 0 days | Discharge: HOME | DRG: 322 | End: 2024-03-19
Attending: INTERNAL MEDICINE | Admitting: INTERNAL MEDICINE
Payer: COMMERCIAL

## 2024-03-18 ENCOUNTER — RESULT REVIEW (OUTPATIENT)
Age: 79
End: 2024-03-18

## 2024-03-18 ENCOUNTER — TRANSCRIPTION ENCOUNTER (OUTPATIENT)
Age: 79
End: 2024-03-18

## 2024-03-18 DIAGNOSIS — Z90.49 ACQUIRED ABSENCE OF OTHER SPECIFIED PARTS OF DIGESTIVE TRACT: Chronic | ICD-10-CM

## 2024-03-18 LAB
A1C WITH ESTIMATED AVERAGE GLUCOSE RESULT: 11.4 % — HIGH (ref 4–5.6)
ALBUMIN SERPL ELPH-MCNC: 4.9 G/DL — SIGNIFICANT CHANGE UP (ref 3.3–5)
ALP SERPL-CCNC: 87 U/L — SIGNIFICANT CHANGE UP (ref 40–120)
ALT FLD-CCNC: 12 U/L — SIGNIFICANT CHANGE UP (ref 10–45)
ANION GAP SERPL CALC-SCNC: 15 MMOL/L — SIGNIFICANT CHANGE UP (ref 5–17)
APTT BLD: 28.5 SEC — SIGNIFICANT CHANGE UP (ref 24.5–35.6)
AST SERPL-CCNC: 16 U/L — SIGNIFICANT CHANGE UP (ref 10–40)
BASOPHILS # BLD AUTO: 0.07 K/UL — SIGNIFICANT CHANGE UP (ref 0–0.2)
BASOPHILS NFR BLD AUTO: 1 % — SIGNIFICANT CHANGE UP (ref 0–2)
BILIRUB SERPL-MCNC: 0.6 MG/DL — SIGNIFICANT CHANGE UP (ref 0.2–1.2)
BLOOD GAS VENOUS - BLOOD UREA NITROGEN: 33 MG/DL — HIGH (ref 7–23)
BLOOD GAS VENOUS - CREATININE: 1.8 MG/DL — HIGH (ref 0.5–1.3)
BUN SERPL-MCNC: 36 MG/DL — HIGH (ref 7–23)
CA-I SERPL-SCNC: 1.24 MMOL/L — SIGNIFICANT CHANGE UP (ref 1.15–1.33)
CALCIUM SERPL-MCNC: 10 MG/DL — SIGNIFICANT CHANGE UP (ref 8.4–10.5)
CHLORIDE BLDV-SCNC: 105 MMOL/L — SIGNIFICANT CHANGE UP (ref 96–108)
CHLORIDE SERPL-SCNC: 103 MMOL/L — SIGNIFICANT CHANGE UP (ref 96–108)
CHOLEST SERPL-MCNC: 136 MG/DL — SIGNIFICANT CHANGE UP
CK MB CFR SERPL CALC: 1.4 NG/ML — SIGNIFICANT CHANGE UP (ref 0–6.7)
CK SERPL-CCNC: 64 U/L — SIGNIFICANT CHANGE UP (ref 30–200)
CO2 BLDV-SCNC: 18.2 MMOL/L — LOW (ref 22–26)
CO2 SERPL-SCNC: 19 MMOL/L — LOW (ref 22–31)
CREAT SERPL-MCNC: 1.34 MG/DL — HIGH (ref 0.5–1.3)
EGFR: 55 ML/MIN/1.73M2 — LOW
EOSINOPHIL # BLD AUTO: 0.24 K/UL — SIGNIFICANT CHANGE UP (ref 0–0.5)
EOSINOPHIL NFR BLD AUTO: 3.3 % — SIGNIFICANT CHANGE UP (ref 0–6)
ESTIMATED AVERAGE GLUCOSE: 280 MG/DL — HIGH (ref 68–114)
GAS PNL BLDV: 133 MMOL/L — LOW (ref 136–145)
GLUCOSE BLDC GLUCOMTR-MCNC: 236 MG/DL — HIGH (ref 70–99)
GLUCOSE BLDC GLUCOMTR-MCNC: 241 MG/DL — HIGH (ref 70–99)
GLUCOSE BLDC GLUCOMTR-MCNC: 246 MG/DL — HIGH (ref 70–99)
GLUCOSE BLDC GLUCOMTR-MCNC: 318 MG/DL — HIGH (ref 70–99)
GLUCOSE BLDV-MCNC: 312 MG/DL — HIGH (ref 70–99)
GLUCOSE SERPL-MCNC: 290 MG/DL — HIGH (ref 70–99)
HCT VFR BLD CALC: 44.8 % — SIGNIFICANT CHANGE UP (ref 39–50)
HCT VFR BLDA CALC: 47 % — SIGNIFICANT CHANGE UP
HDLC SERPL-MCNC: 46 MG/DL — SIGNIFICANT CHANGE UP
HGB BLD-MCNC: 15 G/DL — SIGNIFICANT CHANGE UP (ref 13–17)
IMM GRANULOCYTES NFR BLD AUTO: 0.4 % — SIGNIFICANT CHANGE UP (ref 0–0.9)
INR BLD: 0.93 — SIGNIFICANT CHANGE UP (ref 0.85–1.18)
ISTAT ACTK (ACTIVATED CLOTTING TIME KAOLIN): 358 SEC — HIGH (ref 74–137)
ISTAT INR: 1.3 — HIGH (ref 0.88–1.16)
ISTAT PT: 15 SEC — HIGH (ref 10–12.9)
LACTATE BLDV-MCNC: 1.3 MMOL/L — SIGNIFICANT CHANGE UP (ref 0.5–2)
LIPID PNL WITH DIRECT LDL SERPL: 65 MG/DL — SIGNIFICANT CHANGE UP
LYMPHOCYTES # BLD AUTO: 2.39 K/UL — SIGNIFICANT CHANGE UP (ref 1–3.3)
LYMPHOCYTES # BLD AUTO: 33.1 % — SIGNIFICANT CHANGE UP (ref 13–44)
MAGNESIUM SERPL-MCNC: 2.6 MG/DL — SIGNIFICANT CHANGE UP (ref 1.6–2.6)
MCHC RBC-ENTMCNC: 28.5 PG — SIGNIFICANT CHANGE UP (ref 27–34)
MCHC RBC-ENTMCNC: 33.5 GM/DL — SIGNIFICANT CHANGE UP (ref 32–36)
MCV RBC AUTO: 85.2 FL — SIGNIFICANT CHANGE UP (ref 80–100)
MONOCYTES # BLD AUTO: 0.51 K/UL — SIGNIFICANT CHANGE UP (ref 0–0.9)
MONOCYTES NFR BLD AUTO: 7.1 % — SIGNIFICANT CHANGE UP (ref 2–14)
NEUTROPHILS # BLD AUTO: 3.98 K/UL — SIGNIFICANT CHANGE UP (ref 1.8–7.4)
NEUTROPHILS NFR BLD AUTO: 55.1 % — SIGNIFICANT CHANGE UP (ref 43–77)
NON HDL CHOLESTEROL: 90 MG/DL — SIGNIFICANT CHANGE UP
NRBC # BLD: 0 /100 WBCS — SIGNIFICANT CHANGE UP (ref 0–0)
PCO2 BLDV: 37 MMHG — LOW (ref 42–55)
PH BLDV: 7.36 — SIGNIFICANT CHANGE UP (ref 7.32–7.43)
PLATELET # BLD AUTO: 154 K/UL — SIGNIFICANT CHANGE UP (ref 150–400)
POTASSIUM BLDV-SCNC: 4.9 MMOL/L — SIGNIFICANT CHANGE UP (ref 3.5–5.1)
POTASSIUM SERPL-MCNC: 4.7 MMOL/L — SIGNIFICANT CHANGE UP (ref 3.5–5.3)
POTASSIUM SERPL-SCNC: 4.7 MMOL/L — SIGNIFICANT CHANGE UP (ref 3.5–5.3)
PROT SERPL-MCNC: 7.9 G/DL — SIGNIFICANT CHANGE UP (ref 6–8.3)
PROTHROM AB SERPL-ACNC: 10.6 SEC — SIGNIFICANT CHANGE UP (ref 9.5–13)
RBC # BLD: 5.26 M/UL — SIGNIFICANT CHANGE UP (ref 4.2–5.8)
RBC # FLD: 13.2 % — SIGNIFICANT CHANGE UP (ref 10.3–14.5)
SODIUM SERPL-SCNC: 137 MMOL/L — SIGNIFICANT CHANGE UP (ref 135–145)
TRIGL SERPL-MCNC: 124 MG/DL — SIGNIFICANT CHANGE UP
WBC # BLD: 7.22 K/UL — SIGNIFICANT CHANGE UP (ref 3.8–10.5)
WBC # FLD AUTO: 7.22 K/UL — SIGNIFICANT CHANGE UP (ref 3.8–10.5)

## 2024-03-18 PROCEDURE — 92928 PRQ TCAT PLMT NTRAC ST 1 LES: CPT | Mod: LD

## 2024-03-18 PROCEDURE — 93458 L HRT ARTERY/VENTRICLE ANGIO: CPT | Mod: 26,59

## 2024-03-18 PROCEDURE — 92978 ENDOLUMINL IVUS OCT C 1ST: CPT | Mod: 26,LD

## 2024-03-18 PROCEDURE — 93306 TTE W/DOPPLER COMPLETE: CPT | Mod: 26

## 2024-03-18 PROCEDURE — 99152 MOD SED SAME PHYS/QHP 5/>YRS: CPT

## 2024-03-18 PROCEDURE — 93010 ELECTROCARDIOGRAM REPORT: CPT

## 2024-03-18 RX ORDER — CYCLOBENZAPRINE HYDROCHLORIDE 10 MG/1
1 TABLET, FILM COATED ORAL
Refills: 0 | DISCHARGE

## 2024-03-18 RX ORDER — CLOPIDOGREL BISULFATE 75 MG/1
600 TABLET, FILM COATED ORAL ONCE
Refills: 0 | Status: COMPLETED | OUTPATIENT
Start: 2024-03-18 | End: 2024-03-18

## 2024-03-18 RX ORDER — INSULIN LISPRO 100/ML
VIAL (ML) SUBCUTANEOUS
Refills: 0 | Status: DISCONTINUED | OUTPATIENT
Start: 2024-03-18 | End: 2024-03-19

## 2024-03-18 RX ORDER — DAPAGLIFLOZIN 10 MG/1
10 TABLET, FILM COATED ORAL EVERY 24 HOURS
Refills: 0 | Status: DISCONTINUED | OUTPATIENT
Start: 2024-03-18 | End: 2024-03-19

## 2024-03-18 RX ORDER — DEXTROSE 50 % IN WATER 50 %
12.5 SYRINGE (ML) INTRAVENOUS ONCE
Refills: 0 | Status: DISCONTINUED | OUTPATIENT
Start: 2024-03-18 | End: 2024-03-19

## 2024-03-18 RX ORDER — ROSUVASTATIN CALCIUM 5 MG/1
1 TABLET ORAL
Refills: 0 | DISCHARGE

## 2024-03-18 RX ORDER — GLUCAGON INJECTION, SOLUTION 0.5 MG/.1ML
1 INJECTION, SOLUTION SUBCUTANEOUS ONCE
Refills: 0 | Status: DISCONTINUED | OUTPATIENT
Start: 2024-03-18 | End: 2024-03-19

## 2024-03-18 RX ORDER — DEXTROSE 50 % IN WATER 50 %
25 SYRINGE (ML) INTRAVENOUS ONCE
Refills: 0 | Status: DISCONTINUED | OUTPATIENT
Start: 2024-03-18 | End: 2024-03-19

## 2024-03-18 RX ORDER — LOSARTAN POTASSIUM 100 MG/1
100 TABLET, FILM COATED ORAL DAILY
Refills: 0 | Status: DISCONTINUED | OUTPATIENT
Start: 2024-03-18 | End: 2024-03-19

## 2024-03-18 RX ORDER — SITAGLIPTIN 50 MG/1
1 TABLET, FILM COATED ORAL
Refills: 0 | DISCHARGE

## 2024-03-18 RX ORDER — CLOPIDOGREL BISULFATE 75 MG/1
75 TABLET, FILM COATED ORAL DAILY
Refills: 0 | Status: DISCONTINUED | OUTPATIENT
Start: 2024-03-19 | End: 2024-03-19

## 2024-03-18 RX ORDER — SODIUM CHLORIDE 9 MG/ML
1000 INJECTION INTRAMUSCULAR; INTRAVENOUS; SUBCUTANEOUS
Refills: 0 | Status: DISCONTINUED | OUTPATIENT
Start: 2024-03-18 | End: 2024-03-18

## 2024-03-18 RX ORDER — SODIUM CHLORIDE 9 MG/ML
250 INJECTION INTRAMUSCULAR; INTRAVENOUS; SUBCUTANEOUS ONCE
Refills: 0 | Status: COMPLETED | OUTPATIENT
Start: 2024-03-18 | End: 2024-03-18

## 2024-03-18 RX ORDER — OXYBUTYNIN CHLORIDE 5 MG
1 TABLET ORAL
Refills: 0 | DISCHARGE

## 2024-03-18 RX ORDER — ASPIRIN/CALCIUM CARB/MAGNESIUM 324 MG
81 TABLET ORAL DAILY
Refills: 0 | Status: DISCONTINUED | OUTPATIENT
Start: 2024-03-19 | End: 2024-03-19

## 2024-03-18 RX ORDER — UMECLIDINIUM BROMIDE AND VILANTEROL TRIFENATATE 62.5; 25 UG/1; UG/1
1 POWDER RESPIRATORY (INHALATION) DAILY
Refills: 0 | Status: DISCONTINUED | OUTPATIENT
Start: 2024-03-18 | End: 2024-03-19

## 2024-03-18 RX ORDER — SODIUM CHLORIDE 9 MG/ML
1000 INJECTION, SOLUTION INTRAVENOUS
Refills: 0 | Status: DISCONTINUED | OUTPATIENT
Start: 2024-03-18 | End: 2024-03-19

## 2024-03-18 RX ORDER — ETODOLAC 400 MG/1
1 TABLET, FILM COATED ORAL
Refills: 0 | DISCHARGE

## 2024-03-18 RX ORDER — ATORVASTATIN CALCIUM 80 MG/1
40 TABLET, FILM COATED ORAL AT BEDTIME
Refills: 0 | Status: DISCONTINUED | OUTPATIENT
Start: 2024-03-18 | End: 2024-03-19

## 2024-03-18 RX ORDER — OXYCODONE AND ACETAMINOPHEN 5; 325 MG/1; MG/1
1 TABLET ORAL
Refills: 0 | DISCHARGE

## 2024-03-18 RX ORDER — SODIUM CHLORIDE 9 MG/ML
1000 INJECTION INTRAMUSCULAR; INTRAVENOUS; SUBCUTANEOUS
Refills: 0 | Status: DISCONTINUED | OUTPATIENT
Start: 2024-03-18 | End: 2024-03-19

## 2024-03-18 RX ORDER — DEXTROSE 50 % IN WATER 50 %
15 SYRINGE (ML) INTRAVENOUS ONCE
Refills: 0 | Status: DISCONTINUED | OUTPATIENT
Start: 2024-03-18 | End: 2024-03-19

## 2024-03-18 RX ORDER — UMECLIDINIUM BROMIDE AND VILANTEROL TRIFENATATE 62.5; 25 UG/1; UG/1
1 POWDER RESPIRATORY (INHALATION)
Refills: 0 | DISCHARGE

## 2024-03-18 RX ORDER — ASPIRIN/CALCIUM CARB/MAGNESIUM 324 MG
81 TABLET ORAL ONCE
Refills: 0 | Status: COMPLETED | OUTPATIENT
Start: 2024-03-18 | End: 2024-03-18

## 2024-03-18 RX ORDER — METFORMIN HYDROCHLORIDE 850 MG/1
1 TABLET ORAL
Refills: 0 | DISCHARGE

## 2024-03-18 RX ORDER — NYSTATIN/TRIAMCINOLONE ACET
1 OINTMENT (GRAM) TOPICAL
Refills: 0 | DISCHARGE

## 2024-03-18 RX ADMIN — SODIUM CHLORIDE 250 MILLILITER(S): 9 INJECTION INTRAMUSCULAR; INTRAVENOUS; SUBCUTANEOUS at 11:16

## 2024-03-18 RX ADMIN — CLOPIDOGREL BISULFATE 600 MILLIGRAM(S): 75 TABLET, FILM COATED ORAL at 07:28

## 2024-03-18 RX ADMIN — ATORVASTATIN CALCIUM 40 MILLIGRAM(S): 80 TABLET, FILM COATED ORAL at 22:06

## 2024-03-18 RX ADMIN — Medication 4: at 11:46

## 2024-03-18 RX ADMIN — LOSARTAN POTASSIUM 100 MILLIGRAM(S): 100 TABLET, FILM COATED ORAL at 12:34

## 2024-03-18 RX ADMIN — Medication 4: at 17:32

## 2024-03-18 RX ADMIN — DAPAGLIFLOZIN 10 MILLIGRAM(S): 10 TABLET, FILM COATED ORAL at 12:35

## 2024-03-18 RX ADMIN — Medication 81 MILLIGRAM(S): at 07:28

## 2024-03-18 RX ADMIN — UMECLIDINIUM BROMIDE AND VILANTEROL TRIFENATATE 1 PUFF(S): 62.5; 25 POWDER RESPIRATORY (INHALATION) at 14:37

## 2024-03-18 RX ADMIN — Medication 8: at 22:06

## 2024-03-18 RX ADMIN — SODIUM CHLORIDE 1000 MILLILITER(S): 9 INJECTION INTRAMUSCULAR; INTRAVENOUS; SUBCUTANEOUS at 07:26

## 2024-03-18 NOTE — DISCHARGE NOTE PROVIDER - NSDCMRMEDTOKEN_GEN_ALL_CORE_FT
Anoro Ellipta 62.5 mcg-25 mcg/inh inhalation powder: 1 puff(s) inhaled once a day  aspirin 81 mg oral delayed release capsule: 1 cap(s) orally once a day  Farxiga 10 mg oral tablet: 1 tab(s) orally once a day  olmesartan 40 mg oral tablet: 1 tab(s) orally once a day   Anoro Ellipta 62.5 mcg-25 mcg/inh inhalation powder: 1 puff(s) inhaled once a day  aspirin 81 mg oral delayed release tablet: 1 tab(s) orally once a day  atorvastatin 40 mg oral tablet: 1 tab(s) orally once a day (at bedtime)  clopidogrel 75 mg oral tablet: 1 tab(s) orally once a day  Farxiga 10 mg oral tablet: 1 tab(s) orally once a day  Januvia 100 mg oral tablet: 1 tab(s) orally once a day  olmesartan 40 mg oral tablet: 1 tab(s) orally once a day

## 2024-03-18 NOTE — DISCHARGE NOTE PROVIDER - HOSPITAL COURSE
**INCOMPLETE / IN PROGRESS NOTE**    77 yo M, former smoker (20 years), with PMHx HTN, HLD, DM2, CAD (per CCTA severe FFR+ LAD and RPL w/ borderline severe LCX FFR 0.91), CKD (outpatient sCr in allscripts 1.2), thrombocytopenia ( 2/2024) COPD, KEARA, presented to Dr Murray with c/o chest tightness, however denies GODDARD or exertional CP as well as dizziness, palpitations, orthopnea/PND, leg swelling, LOC, bleeding, melena/hematochezia. He recently had a URI but is now asymptomatic.  - CCTA 2/26/24: Ca score 998 (77%)m, w. FFR positive mLAD (0.72), dLAD (0.68), D1 (0.84), and RPL (0.68), w/ remaining segments FFR negative (pLCX 0.91, p-dRCA 0.94, RPDA and OM1 not visualized as small caliber vessels).  In light of risk factors, symptoms consisting of chest tightness, and abnormal CCTA w/ FFR positive disease, pt now presents for cardiac catheterization with possible intervention if clinically indicated.     s/p LHC (3/18/24): PTCA/NU x2 p-mLAD; D1 mild stenosis, RCA mild stenosis, RPL 80% stenosis, LM minor disease, LCx minor disease; LVEDp 10mmHg. ACCESS: RFA w/ PC for hemostasis.     PLAN: patient to return for staged intervention of RPL lesion in 4-6weeks.     Pt evaluated prior to discharge. VSS, labs unremarkable, no events on telemetry, and physical exam benign with right groin access site stable without hematoma/bleeding. Hospital course reviewed with attending cardiologist and patient deemed stable for discharge home. Pt to follow up with outpatient cardiologist, Dr. Murray, within 1-2 weeks of discahrge home. DAPT: ASA 81mg Po QD and Plavix 75mg PO QD. STATIN: Atorvastatin 40mg PO QD (LDL 65). CARDIAC REHAB prescription NOT provided to patient on discharge as pt w/ residual CAD pending revascularization. Discharge instructions reviewed with patient and prescriptions sent to patient's preferred pharmacy. GLP-1 receptor agonist/SGLT2 inhibitor meds discussed w/ patients and encouraged to discuss further with PMD or Endocrinologist at next visit.  Pt discharge copies detail cardiovascular history, medications, testing/treatments,    77 yo M, former smoker (20 years), with PMHx HTN, HLD, DM2, CAD (per CCTA severe FFR+ LAD and RPL w/ borderline severe LCX FFR 0.91), CKD (outpatient sCr in allscripts 1.2), thrombocytopenia ( 2/2024) COPD, KEARA, presented to Dr Murray with c/o chest tightness, however denies GODDARD or exertional CP as well as dizziness, palpitations, orthopnea/PND, leg swelling, LOC, bleeding, melena/hematochezia. He recently had a URI but is now asymptomatic. CCTA 2/26/24: Ca score 998 (77%), w. FFR positive mLAD (0.72), dLAD (0.68), D1 (0.84), and RPL (0.68), w/ remaining segments FFR negative (pLCX 0.91, p-dRCA 0.94, RPDA and OM1 not visualized as small caliber vessels).  In light of risk factors, symptoms consisting of chest tightness, and abnormal CCTA w/ FFR positive disease, pt now presents for cardiac catheterization with possible intervention if clinically indicated.     Patient is now s/p LHC (3/18/24): PTCA/NU x2 p-mLAD; D1 mild stenosis, RCA mild stenosis, RPL 80% stenosis, LM minor disease, LCx minor disease; LVEDp 10mmHg. ACCESS: RFA w/ PC for hemostasis. Plan for patient to return for staged intervention of RPL lesion in 4-6weeks.     Pt evaluated prior to discharge. VSS, labs unremarkable, no events on telemetry, and physical exam benign with right groin access site stable without hematoma/bleeding. Hospital course reviewed with attending cardiologist and patient deemed stable for discharge home. Prior to discharge, evaluated by endocrinology for uncontrolled DM w/ A1C 11.6%. Started on januvia and provided contact info if patient chooses to change his endocrinologist. Pt to follow up with outpatient cardiologist, Dr. Murray, within 1-2 weeks of discharge home.    Discharge medications: ASA 81mg QD, Plavix 75mg QD, Atorvastatin 40 mg QHS, Olmesartan 40 mg QD, Farxiga 10 mg QD, Janumet 100 mg QD, Anoro Ellipta 52.5-25 mcg/inh 1 puff QD    CARDIAC REHAB prescription NOT provided to patient on discharge as pt w/ residual CAD pending revascularization. Discharge instructions reviewed with patient and prescriptions sent to patient's preferred pharmacy. GLP-1 receptor agonist/SGLT2 inhibitor meds discussed w/ patients and encouraged to discuss further with PMD or Endocrinologist at next visit.

## 2024-03-18 NOTE — PATIENT PROFILE ADULT - FALL HARM RISK - HARM RISK INTERVENTIONS

## 2024-03-18 NOTE — DISCHARGE NOTE PROVIDER - CARE PROVIDER_API CALL
Tyrone Murray  Cardiology  [address] [place] [address], [address]  [place] [address]  Phone: [phone]  Fax: [phone]  Follow Up Time: 2 weeks

## 2024-03-18 NOTE — DISCHARGE NOTE PROVIDER - NSDCFUSCHEDAPPT_GEN_ALL_CORE_FT
[facility]  HEARTVASC [address]  Scheduled Appointment: 03/21/2024    Tyrone Murray  [facility]  HEARTVAS [address]  Scheduled Appointment: 03/27/2024    Venecia Madden  [facility] [address]  Scheduled Appointment: 06/13/2024

## 2024-03-18 NOTE — DISCHARGE NOTE PROVIDER - NSDCCPCAREPLAN_GEN_ALL_CORE_FT
PRINCIPAL DISCHARGE DIAGNOSIS  Diagnosis: CAD (coronary artery disease)  Assessment and Plan of Treatment: You underwent a cardiac angiogram and received two (2) stents to your left anterior descending artery (LAD). PLEASE CONTINUE ASPIRIN 81MG DAILY AND PLAVIX 75MG DAILY. DO NOT STOP THESE MEDICATIONS FOR ANY REASON AS THEY ARE KEEPING YOUR STENT OPEN AND PREVENTING A HEART ATTACK. Avoid strenuous activity or heavy lifting for the next five days. Do not take a bath or swim for the next five days; you may shower. For any bleeding or hematoma formation (hardened blood collection under the skin) at the access site of RIGHT GROIN please hold pressure and go to the emergency room. Please follow up with Dr. SAWYER in 1-2 weeks. For recurrent chest pain, please call your doctor or go to the emergency room. You have a residual blockage in the right side of your coronary artery system that you will return for intervention on in 4-6weeks.      SECONDARY DISCHARGE DIAGNOSES  Diagnosis: HTN (hypertension)  Assessment and Plan of Treatment: Please continue medications as listed to keep your blood pressure controlled. For blood pressure that is too high or too low please see your doctor or go to the emergency room as necessary.    Diagnosis: HLD (hyperlipidemia)  Assessment and Plan of Treatment: Please continue Atorvastatin 40mg once daily to keep your cholesterol low. High cholesterol contributes to heart disease.    Diagnosis: DM (diabetes mellitus)  Assessment and Plan of Treatment: Please continue medications as listed for diabetes. Maintain a low carbohydrate, low sugar diet. Check for your blood sugars regularly.     PRINCIPAL DISCHARGE DIAGNOSIS  Diagnosis: CAD (coronary artery disease)  Assessment and Plan of Treatment: You underwent a cardiac angiogram and received two (2) stents to your left anterior descending artery (LAD). PLEASE CONTINUE ASPIRIN 81MG DAILY AND PLAVIX 75MG DAILY. DO NOT STOP THESE MEDICATIONS FOR ANY REASON AS THEY ARE KEEPING YOUR STENT OPEN AND PREVENTING A HEART ATTACK. Avoid strenuous activity or heavy lifting for the next five days. Do not take a bath or swim for the next five days; you may shower. For any bleeding or hematoma formation (hardened blood collection under the skin) at the access site of RIGHT GROIN please hold pressure and go to the emergency room. Please follow up with Dr. SAWYER in 1-2 weeks. For recurrent chest pain, please call your doctor or go to the emergency room. You have a residual blockage in the right side of your coronary artery system that you will return for intervention on in 4-6weeks.      SECONDARY DISCHARGE DIAGNOSES  Diagnosis: HTN (hypertension)  Assessment and Plan of Treatment: Please continue medications as listed to keep your blood pressure controlled. For blood pressure that is too high or too low please see your doctor or go to the emergency room as necessary.    Diagnosis: HLD (hyperlipidemia)  Assessment and Plan of Treatment: Please continue Atorvastatin 40mg once daily to keep your cholesterol low. High cholesterol contributes to heart disease.    Diagnosis: DM (diabetes mellitus)  Assessment and Plan of Treatment: Please continue farxiga 10 mg and Januvia 100 mg daily (NEW MEDICATION) as listed for diabetes. Maintain a low carbohydrate, low sugar diet. Check for your blood sugars regularly.

## 2024-03-19 ENCOUNTER — TRANSCRIPTION ENCOUNTER (OUTPATIENT)
Age: 79
End: 2024-03-19

## 2024-03-19 VITALS
SYSTOLIC BLOOD PRESSURE: 131 MMHG | OXYGEN SATURATION: 97 % | DIASTOLIC BLOOD PRESSURE: 58 MMHG | RESPIRATION RATE: 16 BRPM | HEART RATE: 89 BPM

## 2024-03-19 DIAGNOSIS — E11.9 TYPE 2 DIABETES MELLITUS WITHOUT COMPLICATIONS: ICD-10-CM

## 2024-03-19 LAB
ANION GAP SERPL CALC-SCNC: 15 MMOL/L — SIGNIFICANT CHANGE UP (ref 5–17)
BUN SERPL-MCNC: 25 MG/DL — HIGH (ref 7–23)
CALCIUM SERPL-MCNC: 9.4 MG/DL — SIGNIFICANT CHANGE UP (ref 8.4–10.5)
CHLORIDE SERPL-SCNC: 108 MMOL/L — SIGNIFICANT CHANGE UP (ref 96–108)
CO2 SERPL-SCNC: 18 MMOL/L — LOW (ref 22–31)
CREAT SERPL-MCNC: 1.25 MG/DL — SIGNIFICANT CHANGE UP (ref 0.5–1.3)
EGFR: 60 ML/MIN/1.73M2 — SIGNIFICANT CHANGE UP
GLUCOSE BLDC GLUCOMTR-MCNC: 147 MG/DL — HIGH (ref 70–99)
GLUCOSE BLDC GLUCOMTR-MCNC: 342 MG/DL — HIGH (ref 70–99)
GLUCOSE SERPL-MCNC: 150 MG/DL — HIGH (ref 70–99)
HCT VFR BLD CALC: 44.8 % — SIGNIFICANT CHANGE UP (ref 39–50)
HCV AB S/CO SERPL IA: 0.04 S/CO — SIGNIFICANT CHANGE UP
HCV AB SERPL-IMP: SIGNIFICANT CHANGE UP
HGB BLD-MCNC: 14.5 G/DL — SIGNIFICANT CHANGE UP (ref 13–17)
MAGNESIUM SERPL-MCNC: 2.2 MG/DL — SIGNIFICANT CHANGE UP (ref 1.6–2.6)
MCHC RBC-ENTMCNC: 28.4 PG — SIGNIFICANT CHANGE UP (ref 27–34)
MCHC RBC-ENTMCNC: 32.4 GM/DL — SIGNIFICANT CHANGE UP (ref 32–36)
MCV RBC AUTO: 87.7 FL — SIGNIFICANT CHANGE UP (ref 80–100)
NRBC # BLD: 0 /100 WBCS — SIGNIFICANT CHANGE UP (ref 0–0)
PLATELET # BLD AUTO: 140 K/UL — LOW (ref 150–400)
POTASSIUM SERPL-MCNC: 4.1 MMOL/L — SIGNIFICANT CHANGE UP (ref 3.5–5.3)
POTASSIUM SERPL-SCNC: 4.1 MMOL/L — SIGNIFICANT CHANGE UP (ref 3.5–5.3)
RBC # BLD: 5.11 M/UL — SIGNIFICANT CHANGE UP (ref 4.2–5.8)
RBC # FLD: 13.2 % — SIGNIFICANT CHANGE UP (ref 10.3–14.5)
SODIUM SERPL-SCNC: 141 MMOL/L — SIGNIFICANT CHANGE UP (ref 135–145)
WBC # BLD: 7.11 K/UL — SIGNIFICANT CHANGE UP (ref 3.8–10.5)
WBC # FLD AUTO: 7.11 K/UL — SIGNIFICANT CHANGE UP (ref 3.8–10.5)

## 2024-03-19 PROCEDURE — 36415 COLL VENOUS BLD VENIPUNCTURE: CPT

## 2024-03-19 PROCEDURE — 80053 COMPREHEN METABOLIC PANEL: CPT

## 2024-03-19 PROCEDURE — 80048 BASIC METABOLIC PNL TOTAL CA: CPT

## 2024-03-19 PROCEDURE — 85027 COMPLETE CBC AUTOMATED: CPT

## 2024-03-19 PROCEDURE — C1753: CPT

## 2024-03-19 PROCEDURE — 83036 HEMOGLOBIN GLYCOSYLATED A1C: CPT

## 2024-03-19 PROCEDURE — 85025 COMPLETE CBC W/AUTO DIFF WBC: CPT

## 2024-03-19 PROCEDURE — 85610 PROTHROMBIN TIME: CPT

## 2024-03-19 PROCEDURE — 82550 ASSAY OF CK (CPK): CPT

## 2024-03-19 PROCEDURE — C1769: CPT

## 2024-03-19 PROCEDURE — 80061 LIPID PANEL: CPT

## 2024-03-19 PROCEDURE — 93005 ELECTROCARDIOGRAM TRACING: CPT

## 2024-03-19 PROCEDURE — 86803 HEPATITIS C AB TEST: CPT

## 2024-03-19 PROCEDURE — 83735 ASSAY OF MAGNESIUM: CPT

## 2024-03-19 PROCEDURE — 85347 COAGULATION TIME ACTIVATED: CPT

## 2024-03-19 PROCEDURE — 85730 THROMBOPLASTIN TIME PARTIAL: CPT

## 2024-03-19 PROCEDURE — 82553 CREATINE MB FRACTION: CPT

## 2024-03-19 PROCEDURE — 99221 1ST HOSP IP/OBS SF/LOW 40: CPT

## 2024-03-19 PROCEDURE — C1887: CPT

## 2024-03-19 PROCEDURE — C1725: CPT

## 2024-03-19 PROCEDURE — 82962 GLUCOSE BLOOD TEST: CPT

## 2024-03-19 PROCEDURE — C1760: CPT

## 2024-03-19 PROCEDURE — C1874: CPT

## 2024-03-19 PROCEDURE — 94640 AIRWAY INHALATION TREATMENT: CPT

## 2024-03-19 PROCEDURE — C1894: CPT

## 2024-03-19 PROCEDURE — 99239 HOSP IP/OBS DSCHRG MGMT >30: CPT

## 2024-03-19 PROCEDURE — C8929: CPT

## 2024-03-19 RX ORDER — ASPIRIN/CALCIUM CARB/MAGNESIUM 324 MG
1 TABLET ORAL
Qty: 90 | Refills: 3
Start: 2024-03-19 | End: 2025-03-13

## 2024-03-19 RX ORDER — CLOPIDOGREL BISULFATE 75 MG/1
1 TABLET, FILM COATED ORAL
Qty: 90 | Refills: 3
Start: 2024-03-19 | End: 2025-03-13

## 2024-03-19 RX ORDER — ATORVASTATIN CALCIUM 80 MG/1
1 TABLET, FILM COATED ORAL
Qty: 90 | Refills: 0
Start: 2024-03-19 | End: 2024-06-16

## 2024-03-19 RX ORDER — SITAGLIPTIN 50 MG/1
1 TABLET, FILM COATED ORAL
Qty: 30 | Refills: 3
Start: 2024-03-19 | End: 2024-07-16

## 2024-03-19 RX ORDER — ASPIRIN/CALCIUM CARB/MAGNESIUM 324 MG
1 TABLET ORAL
Refills: 0 | DISCHARGE

## 2024-03-19 RX ADMIN — DAPAGLIFLOZIN 10 MILLIGRAM(S): 10 TABLET, FILM COATED ORAL at 10:50

## 2024-03-19 RX ADMIN — Medication 8: at 12:13

## 2024-03-19 RX ADMIN — UMECLIDINIUM BROMIDE AND VILANTEROL TRIFENATATE 1 PUFF(S): 62.5; 25 POWDER RESPIRATORY (INHALATION) at 10:52

## 2024-03-19 RX ADMIN — LOSARTAN POTASSIUM 100 MILLIGRAM(S): 100 TABLET, FILM COATED ORAL at 06:16

## 2024-03-19 RX ADMIN — Medication 81 MILLIGRAM(S): at 10:50

## 2024-03-19 RX ADMIN — CLOPIDOGREL BISULFATE 75 MILLIGRAM(S): 75 TABLET, FILM COATED ORAL at 10:50

## 2024-03-19 NOTE — DISCHARGE NOTE NURSING/CASE MANAGEMENT/SOCIAL WORK - NSDCPEFALRISK_GEN_ALL_CORE
For information on Fall & Injury Prevention, visit: [email or web address] OR  [email or web address] OR  https://www.cdc.gov/steadi/patient.html

## 2024-03-19 NOTE — CONSULT NOTE ADULT - PROBLEM SELECTOR RECOMMENDATION 9
Type 2 diabetes mellitus with hyperglycemia  - Please continue lantus *** units at bedtime.   - Continue lispro *** units before each meal.  - Continue lispro moderate / low dose sliding scale before meals and at bedtime.  - Patient's fingerstick glucose goal is 100-180 mg/dL.    - For discharge, patient can ***.    - Patient can follow up at discharge with [facility] by calling [phone] to make an appointment.      Case discussed with Dr. Correa. Primary team updated. Type 2 diabetes mellitus with hyperglycemia  - Patient's fingerstick glucose goal is 100-180 mg/dL.    - For discharge: Continue farxiga 10mg daily and restart Januvia 100mg daily.  - Patient can follow up at discharge with Dr Ku next week. Provided contact information for Dr Reed at [facility] by calling [phone] to make an appointment.  He prefers [address] [place] [address] office location.    Primary team updated.

## 2024-03-19 NOTE — DISCHARGE NOTE NURSING/CASE MANAGEMENT/SOCIAL WORK - NSTOBACCONEVERSMOKERY/N_GEN_A
[facility]  Clinic Note    Cody De Los Santos  : 1955  PCP: No Pcp    Due to COVID19 action plan the patients office visit was converted to a phone visit.   The patient has verbally consented to a telephone vist.       Pt states they are oCdy De Los Santos and they are speaking to me from their home.     Reason for Consultation:  Chief Complaint   Patient presents with   • Implantable Defibrillator     ventricular tachycardia   • Medication Management     refill addressed       Assessment/Plan:  Cody De Los Santos is a 64 year old man with nonischemic cardiomyopathy, CRT ICD, has recovery of LV function but having episodes of ventricular tachycardia, presently doing well on sotalol and carvedilol.     Recently admitted with pneumonia and was noted to be in AF with RVR. He converted to SR, was started on eliquis.      ECG today shows SR, PACs, V pacing.     1) Continue current medications.     2) Continue routine follow up in the device clinic and set up remote follow ups.    3) Check echo before next visit to monitor LVEF.    20 minutes spent reviewing diagnosis and treatment plan. All questions answered to patients satisfaction.    Follow up: 3 months    History of Present Illness:  Cody De Los Santos is a 64 year old man with PMHx of nonischemic cardiomyopathy, CRT ICD, has recovery of LV function but having episodes of ventricular tachycardia, presently doing well on sotalol and carvedilol.     Recently admitted with pneumonia and was noted to be in AF with RVR. He converted to SR, was started on eliquis.      LVEF was noted to be 45%, slightly decreased from the previous study, possibly secondary to tachycardia.     PMHx:  Past Medical History:   Diagnosis Date   • Congestive heart failure (CHF) (CMS/[facility]) 2009   • Deep venous thrombosis (CMS/[facility])        • Dilated cardiomyopathy (CMS/[facility]) 2009   • Essential hypertension 2009   • ICD (implantable cardioverter-defibrillator) in place         PSHx:  Past Surgical History:   Procedure Laterality Date   • Cardiac catherization  02/2009     cardiac catheterization    • Cardiac defibrillator placement  2009    implantable cardioverter defibrillator   • Cardiac device check - in clinic      ICD site   • Upgrade to bi-v implant  01/11/2016     BiV generator change, [facility] [place],       Family Hx:  Family History   Problem Relation Age of Onset   • Hypertension Mother    • Coronary Artery Disease Neg Hx         Negative for premature CAD   • Aneurysm Neg Hx         Negative for AAA.       Social Hx:  he  reports that he has never smoked. He has never used smokeless tobacco. He reports that he does not drink alcohol or use drugs.    Allergies:  ALLERGIES:  No Known Allergies    Medications:  Current Outpatient Medications   Medication Sig Dispense Refill   • sotalol (BETAPACE) 80 MG tablet Take 1 tablet by mouth 2 times daily. 180 tablet 3   • spironolactone (ALDACTONE) 25 MG tablet Take 1 tablet by mouth once daily 90 tablet 3   • digoxin (LANOXIN) 0.125 MG tablet Take 1 tablet by mouth once daily 90 tablet 3   • ELIQUIS 5 MG Tab Take 1 tablet by mouth twice daily 180 tablet 3   • carvedilol (COREG) 25 MG tablet TAKE 1 TABLET BY MOUTH TWICE DAILY AS DIRECTED 180 tablet 3   • lisinopril (ZESTRIL) 20 MG tablet TAKE 1 TABLET BY MOUTH TWICE DAILY 180 tablet 3   • CIALIS 5 MG tablet TAKE 1 TABLET BY MOUTH DAILY 1 HOUR BEFORE NEEDED 30 tablet 0     No current facility-administered medications for this visit.        Review of Systems:  All systems reviewed and negative.     P  Labs:  No visits with results within 3 Month(s) from this visit.   Latest known visit with results is:   Prior Original Records on 10/06/2018   Component Date Value Ref Range Status   • SGOT (AST) 10/06/2018 19  IU/L Final   • SGPT (ALT) 10/06/2018 29  IU/L Final   • Bilirubin Total 10/06/2018 1.3  mg/dL Final   • Protein, Total 10/06/2018 7.6  g/dL Final   • BUN 10/06/2018 19  mg/dL Final    • Creatinine, Serum 10/06/2018 0.80  mg/dL Final   • Glucose 10/06/2018 87  mg/dL Final   • Potassium, Serum 10/06/2018 4.1  mEq/L Final   • Sodium 10/06/2018 137  mEq/L Final   • Chloride 10/06/2018 105  mEq/L Final   • Calcium 10/06/2018 8.8  mg/dL Final   • Magnesium 10/06/2018 2.0  mg/dL Final   • Albumin 10/06/2018 4.1  g/dL Final   • Globulin 10/06/2018 3.5  g/dL Final   • Alkaline Phosphatate(Alk Phos) 10/06/2018 44  20 - 140 IU/L Final   • Cholesterol, Total 10/06/2018 187  mg/dL Final   • Triglycerides 10/06/2018 66  mg/dL Final   • HDL Cholesterol 10/06/2018 57  mg/dL Final   • LDL Cholesterol 10/06/2018 117  mg/dL Final   ]        Data:        Diagnosis:  Patient Active Problem List   Diagnosis   • Essential hypertension   • Cardiomyopathy, dilated, nonischemic (CMS/HCC)   • ICD (implantable cardioverter-defibrillator) in place   • Ventricular tachycardia (CMS/HCC)   • Encounter for monitoring sotalol therapy   • Atrial fibrillation, currently in sinus rhythm   • Anticoagulated       Luis Fernando Pascal MD   No

## 2024-03-19 NOTE — CONSULT NOTE ADULT - ASSESSMENT
A1C: 11.4 %  BUN: 25  Creatinine: 1.25  GFR: 60  Weight: 86.2  BMI: 32.6  EF: 60-65% 79 yo M, former smoker (20 years), with PMHx HTN, HLD, DM2, CAD (per CCTA severe FFR+ LAD and RPL w/ borderline severe LCX FFR 0.91), CKD (outpatient sCr in allscripts 1.2), thrombocytopenia ( 2/2024) COPD, KEARA, presented to Dr Murray with c/o chest tightness presents for  cardiac catheterization on 3/18/2024: cutting balloon/NU x 2 p/mLAD (90% diffuse stenosis), D1 mild stenosis. RCA mild dz, RPL 80% stenosis. LM minor dz. LCx minor dz.     Endocrine consulted for uncontrolled type 2 diabetes. Diabetes was controlled 3 months ago on metformin and farxiga. Increased A1C likely from discontinuation of metformin, possibly exacerbated by short term steroid inhaler and recent URI. Can't restart  metformin due to GI SE. Advised to find out if he was taking IR or ER, and if IR he can be rechallenged with ER as OP. Though we don't know why januvia was discontinued, I can't think of a strong contraindication, so will restart. Advised pt/daughter to reach out to Dr Ku to see why it was stopped prior to re-starting. He has appt with Dr Ku next week, but they are considering changing to [facility] to keep all providers centrally located. Advised to keep appt. with Dr Ku in the interim. Discussed inquiring about CGM as OP, as home monitoring is the best way to evaluate efficacy of treatment and dietary changes, without having to wait till appt. in 3 months. Discussed possible transition from DDP-4 to GLP-1 for imprroved A1C reduction, weight loss and ACE risk reduction. Provided written information on A1C, glucose targets and healthy eating.    A1C: 11.4 %  BUN: 25  Creatinine: 1.25  GFR: 60  Weight: 86.2  BMI: 32.6  EF: 60-65%

## 2024-03-19 NOTE — DISCHARGE NOTE NURSING/CASE MANAGEMENT/SOCIAL WORK - PATIENT PORTAL LINK FT
You can access the FollowMyHealth Patient Portal offered by [facility] by registering at the following website: [email or web address]. By joining [email or web address] FollowMyHealth portal, you will also be able to view your health information using other applications (apps) compatible with our system.

## 2024-03-19 NOTE — CONSULT NOTE ADULT - SUBJECTIVE AND OBJECTIVE BOX
HISTORY OF PRESENT ILLNESS  77 yo M, former smoker (20 years), with PMHx HTN, HLD, DM2, CAD (per CCTA severe FFR+ LAD and RPL w/ borderline severe LCX FFR 0.91), CKD (outpatient sCr in [facility] 1.2), thrombocytopenia ( 2/2024) COPD, KEARA, presented to Dr Murray with c/o chest tightness, however denies GODDARD or exertional CP as well as dizziness, palpitations, orthopnea/PND, leg swelling, LOC, bleeding, melena/hematochezia. He recently had a URI but is now asymptomatic.    Past Testing:  - CCTA 2/26/24: Ca score 998 (77%)m, w. FFR positive mLAD (0.72), dLAD (0.68), D1 (0.84), and RPL (0.68), w/ remaining segments FFR negative (pLCX 0.91, p-dRCA 0.94, RPDA and OM1 not visualized as small caliber vessels).    In light of risk factors, symptoms consisting of chest tightness, and abnormal CCTA w/ FFR positive disease, pt now presents for cardiac catheterization with possible intervention if clinically indicated.     3/18: s/p cardiac cath: cutting balloon/NU x 2 p/mLAD (90% diffuse stenosis), D1 mild stenosis. RCA mild dz, RPL 80% stenosis. LM minor dz. LCx minor dz. No AS. EDP 10. F/u TTE.    CAPILLARY BLOOD GLUCOSE & INSULIN RECEIVED  241 mg/dL (03-18 @ 17:01)  318 mg/dL (03-18 @ 21:57)  150 mg/dL (03-19 @ 05:30)  147 mg/dL (03-19 @ 08:02)      DIABETES HISTORY  - Age at diagnosis:   - Symptoms at time of diagnosis:   - Current Therapy:  - History of other regimens:   - History of hypoglycemia:   - History of DKA/HHS:   - Complications:   - Home FSG:        > Fasting: *** mg/dL.        > Before meals: *** mg/dL.        > Bedtime: *** mg/dL.  - Diet:          > Breakfast:         > Lunch:        > Dinner:        > Snacks:  - Physical activity:    - Outpatient follow-up:     PAST MEDICAL & SURGICAL HISTORY  As per history of present illness.     FAMILY HISTORY  - Diabetes:  - Thyroid:  - Autoimmune:  - Other:    SOCIAL HISTORY  - Work:  - Alcohol:  - Smoking:  - Recreational Drugs:    ALLERGIES  No Known Allergies    CURRENT MEDICATIONS  aspirin enteric coated 81 milliGRAM(s) Oral daily  atorvastatin 40 milliGRAM(s) Oral at bedtime  chlorhexidine 4% Liquid 1 Application(s) Topical once  clopidogrel Tablet 75 milliGRAM(s) Oral daily  dapagliflozin 10 milliGRAM(s) Oral every 24 hours  dextrose 5%. 1000 milliLiter(s) IV Continuous <Continuous>  dextrose 5%. 1000 milliLiter(s) IV Continuous <Continuous>  dextrose 50% Injectable 25 Gram(s) IV Push once  dextrose 50% Injectable 12.5 Gram(s) IV Push once  dextrose 50% Injectable 25 Gram(s) IV Push once  dextrose Oral Gel 15 Gram(s) Oral once PRN  glucagon  Injectable 1 milliGRAM(s) IntraMuscular once  insulin lispro (ADMELOG) corrective regimen sliding scale   SubCutaneous Before meals and at bedtime  losartan 100 milliGRAM(s) Oral daily  sodium chloride 0.9%. 1000 milliLiter(s) IV Continuous <Continuous>  umeclidinium 62.5 MICROgram(s)/vilanterol 25 MICROgram(s) Inhaler 1 Puff(s) Inhalation daily    REVIEW OF SYSTEMS  Constitutional:  Negative fever, chills or loss of appetite.  Eyes:  Negative blurry vision or double vision.  Cardiovascular:  Negative for chest pain or palpitations.  Respiratory:  Negative for cough, wheezing, or shortness of breath.   Gastrointestinal:  Negative for nausea, vomiting, diarrhea, constipation, or abdominal pain.  Genitourinary:  Negative frequency, urgency or dysuria.  Neurologic:  No headache, confusion, dizziness, lightheadedness.    PHYSICAL EXAM  Vital Signs Last 24 Hrs  T(C): 36.7 (19 Mar 2024 08:32), Max: 36.7 (18 Mar 2024 21:13)  T(F): 98.1 (19 Mar 2024 08:32), Max: 98.1 (19 Mar 2024 08:32)  HR: 89 (19 Mar 2024 11:03) (83 - 92)  BP: 131/58 (19 Mar 2024 11:03) (117/59 - 148/63)  BP(mean): 86 (19 Mar 2024 06:11) (86 - 94)  RR: 16 (19 Mar 2024 11:03) (16 - 17)  SpO2: 97% (19 Mar 2024 11:03) (95% - 99%)    Parameters below as of 19 Mar 2024 11:03  Patient On (Oxygen Delivery Method): room air    Constitutional: Awake, alert, in no acute distress.   HEENT: Normocephalic, atraumatic, PAVAN, no proptosis or lid retraction.   Neck: supple, no acanthosis, no thyromegaly or palpable thyroid nodules.  Respiratory: Lungs clear to ausculation bilaterally.   Cardiovascular: regular rhythm, normal S1 and S2, no audible murmurs.   GI: soft, non-tender, non-distended, bowel sounds present, no masses appreciated.  Extremities: No lower extremity edema, peripheral pulses present.   Skin: no rashes.   Psychiatric: AAO x 3. Normal affect/mood.     LABS  CBC - WBC/HGB/HTC/PLT: 7.11/14.5/44.8/140 (03-19-24)  BMP: Na/K/Cl/Bicarb/BUN/Cr/Gluc: 141/4.1/108/18/25/1.25/150 (03-19-24)  Anion Gap: 15 (03-19-24)  eGFR: 60 (03-19-24)  Calcium: 9.4 (03-19-24)  Phosphorus: -- (03-19-24)  Magnesium: 2.2 (03-19-24)  LFT - Alb/Tprot/Tbili/Dbili/AlkPhos/ALT/AST: 4.9/--/0.6/--/87/12/16 (03-18-24)  PT/aPTT/INR: 10.6/28.5/0.93 (03-18-24)       HISTORY OF PRESENT ILLNESS  79 yo M, former smoker (20 years), with PMHx HTN, HLD, DM2, CAD (per CCTA severe FFR+ LAD and RPL w/ borderline severe LCX FFR 0.91), CKD (outpatient sCr in [facility] 1.2), thrombocytopenia ( 2/2024) COPD, KEARA, presented to Dr Murray with c/o chest tightness, however denies GODDARD or exertional CP as well as dizziness, palpitations, orthopnea/PND, leg swelling, LOC, bleeding, melena/hematochezia. He recently had a URI but is now asymptomatic.  - CCTA 2/26/24: Ca score 998 (77%)m, w. FFR positive mLAD (0.72), dLAD (0.68), D1 (0.84), and RPL (0.68), w/ remaining segments FFR negative (pLCX 0.91, p-dRCA 0.94, RPDA and OM1 not visualized as small caliber vessels).  In light of risk factors, symptoms consisting of chest tightness, and abnormal CCTA w/ FFR positive disease, pt now presents for cardiac catheterization with possible intervention if clinically indicated.   3/18: s/p cardiac cath: cutting balloon/NU x 2 p/mLAD (90% diffuse stenosis), D1 mild stenosis. RCA mild dz, RPL 80% stenosis. LM minor dz. LCx minor dz. No AS. EDP 10. F/u TTE.    Endocrine consulted for uncontrolled type 2 diabetes. Patient seen ab bedside with daughter. He has been in prediabetic range for many years and is followed by endocrinologist Dr Ku, with visits every 3 months. He has been on Metformin 2000mg daily, farxiga 10 mg daily, januvia 100mg daily. Januvia was stopped 6 months ago for unknown reasons. 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    CAPILLARY BLOOD GLUCOSE & INSULIN RECEIVED  241 mg/dL (03-18 @ 17:01)  318 mg/dL (03-18 @ 21:57)  150 mg/dL (03-19 @ 05:30)  147 mg/dL (03-19 @ 08:02)      DIABETES HISTORY  - Age at diagnosis:   - Symptoms at time of diagnosis:   - Current Therapy:  - History of other regimens:   - History of hypoglycemia:   - History of DKA/HHS:   - Complications:   - Home FSG:        > Fasting: *** mg/dL.        > Before meals: *** mg/dL.        > Bedtime: *** mg/dL.  - Diet:          > Breakfast:         > Lunch:        > Dinner:        > Snacks:  - Physical activity:    - Outpatient follow-up:     PAST MEDICAL & SURGICAL HISTORY  As per history of present illness.     FAMILY HISTORY  - Diabetes:  - Thyroid:  - Autoimmune:  - Other:    SOCIAL HISTORY  - Work:  - Alcohol:  - Smoking:  - Recreational Drugs:    ALLERGIES  No Known Allergies    CURRENT MEDICATIONS  aspirin enteric coated 81 milliGRAM(s) Oral daily  atorvastatin 40 milliGRAM(s) Oral at bedtime  chlorhexidine 4% Liquid 1 Application(s) Topical once  clopidogrel Tablet 75 milliGRAM(s) Oral daily  dapagliflozin 10 milliGRAM(s) Oral every 24 hours  dextrose 5%. 1000 milliLiter(s) IV Continuous <Continuous>  dextrose 5%. 1000 milliLiter(s) IV Continuous <Continuous>  dextrose 50% Injectable 25 Gram(s) IV Push once  dextrose 50% Injectable 12.5 Gram(s) IV Push once  dextrose 50% Injectable 25 Gram(s) IV Push once  dextrose Oral Gel 15 Gram(s) Oral once PRN  glucagon  Injectable 1 milliGRAM(s) IntraMuscular once  insulin lispro (ADMELOG) corrective regimen sliding scale   SubCutaneous Before meals and at bedtime  losartan 100 milliGRAM(s) Oral daily  sodium chloride 0.9%. 1000 milliLiter(s) IV Continuous <Continuous>  umeclidinium 62.5 MICROgram(s)/vilanterol 25 MICROgram(s) Inhaler 1 Puff(s) Inhalation daily    REVIEW OF SYSTEMS  Constitutional:  Negative fever, chills or loss of appetite.  Eyes:  Negative blurry vision or double vision.  Cardiovascular:  Negative for chest pain or palpitations.  Respiratory:  Negative for cough, wheezing, or shortness of breath.   Gastrointestinal:  Negative for nausea, vomiting, diarrhea, constipation, or abdominal pain.  Genitourinary:  Negative frequency, urgency or dysuria.  Neurologic:  No headache, confusion, dizziness, lightheadedness.    PHYSICAL EXAM  Vital Signs Last 24 Hrs  T(C): 36.7 (19 Mar 2024 08:32), Max: 36.7 (18 Mar 2024 21:13)  T(F): 98.1 (19 Mar 2024 08:32), Max: 98.1 (19 Mar 2024 08:32)  HR: 89 (19 Mar 2024 11:03) (83 - 92)  BP: 131/58 (19 Mar 2024 11:03) (117/59 - 148/63)  BP(mean): 86 (19 Mar 2024 06:11) (86 - 94)  RR: 16 (19 Mar 2024 11:03) (16 - 17)  SpO2: 97% (19 Mar 2024 11:03) (95% - 99%)    Parameters below as of 19 Mar 2024 11:03  Patient On (Oxygen Delivery Method): room air    Constitutional: Awake, alert, in no acute distress.   HEENT: Normocephalic, atraumatic, PAVAN, no proptosis or lid retraction.   Neck: supple, no acanthosis, no thyromegaly or palpable thyroid nodules.  Respiratory: Lungs clear to ausculation bilaterally.   Cardiovascular: regular rhythm, normal S1 and S2, no audible murmurs.   GI: soft, non-tender, non-distended, bowel sounds present, no masses appreciated.  Extremities: No lower extremity edema, peripheral pulses present.   Skin: no rashes.   Psychiatric: AAO x 3. Normal affect/mood.     LABS  CBC - WBC/HGB/HTC/PLT: 7.11/14.5/44.8/140 (03-19-24)  BMP: Na/K/Cl/Bicarb/BUN/Cr/Gluc: 141/4.1/108/18/25/1.25/150 (03-19-24)  Anion Gap: 15 (03-19-24)  eGFR: 60 (03-19-24)  Calcium: 9.4 (03-19-24)  Phosphorus: -- (03-19-24)  Magnesium: 2.2 (03-19-24)  LFT - Alb/Tprot/Tbili/Dbili/AlkPhos/ALT/AST: 4.9/--/0.6/--/87/12/16 (03-18-24)  PT/aPTT/INR: 10.6/28.5/0.93 (03-18-24)       HISTORY OF PRESENT ILLNESS  77 yo M, former smoker (20 years), with PMHx HTN, HLD, DM2, CAD (per CCTA severe FFR+ LAD and RPL w/ borderline severe LCX FFR 0.91), CKD (outpatient sCr in [facility] 1.2), thrombocytopenia ( 2/2024) COPD, KEARA, presented to Dr Murray with c/o chest tightness, however denies GODDARD or exertional CP as well as dizziness, palpitations, orthopnea/PND, leg swelling, LOC, bleeding, melena/hematochezia. He recently had a URI but is now asymptomatic.  - CCTA 2/26/24: Ca score 998 (77%)m, w. FFR positive mLAD (0.72), dLAD (0.68), D1 (0.84), and RPL (0.68), w/ remaining segments FFR negative (pLCX 0.91, p-dRCA 0.94, RPDA and OM1 not visualized as small caliber vessels).  In light of risk factors, symptoms consisting of chest tightness, and abnormal CCTA w/ FFR positive disease, pt now presents for cardiac catheterization with possible intervention if clinically indicated.   3/18: s/p cardiac cath: cutting balloon/NU x 2 p/mLAD (90% diffuse stenosis), D1 mild stenosis. RCA mild dz, RPL 80% stenosis. LM minor dz. LCx minor dz. No AS. EDP 10. F/u TTE.    Endocrine consulted for uncontrolled type 2 diabetes. Patient seen at bedside with daughter. He has been in prediabetic range for many years and is followed by endocrinologist Dr Ku, with visits every 3 months. He has been on Metformin 2000mg daily, farxiga 10 mg daily, januvia 100mg daily. Januvia was stopped 6 months ago for unknown reasons. 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    CAPILLARY BLOOD GLUCOSE & INSULIN RECEIVED  241 mg/dL (03-18 @ 17:01)  318 mg/dL (03-18 @ 21:57)  150 mg/dL (03-19 @ 05:30)  147 mg/dL (03-19 @ 08:02)      DIABETES HISTORY  - Age at diagnosis:   - Symptoms at time of diagnosis:   - Current Therapy:  - History of other regimens:   - History of hypoglycemia:   - History of DKA/HHS:   - Complications:   - Home FSG:        > Fasting: *** mg/dL.        > Before meals: *** mg/dL.        > Bedtime: *** mg/dL.  - Diet:          > Breakfast:         > Lunch:        > Dinner:        > Snacks:  - Physical activity:    - Outpatient follow-up:     PAST MEDICAL & SURGICAL HISTORY  As per history of present illness.     FAMILY HISTORY  - Diabetes:  - Thyroid:  - Autoimmune:  - Other:    SOCIAL HISTORY  - Work:  - Alcohol:  - Smoking:  - Recreational Drugs:    ALLERGIES  No Known Allergies    CURRENT MEDICATIONS  aspirin enteric coated 81 milliGRAM(s) Oral daily  atorvastatin 40 milliGRAM(s) Oral at bedtime  chlorhexidine 4% Liquid 1 Application(s) Topical once  clopidogrel Tablet 75 milliGRAM(s) Oral daily  dapagliflozin 10 milliGRAM(s) Oral every 24 hours  dextrose 5%. 1000 milliLiter(s) IV Continuous <Continuous>  dextrose 5%. 1000 milliLiter(s) IV Continuous <Continuous>  dextrose 50% Injectable 25 Gram(s) IV Push once  dextrose 50% Injectable 12.5 Gram(s) IV Push once  dextrose 50% Injectable 25 Gram(s) IV Push once  dextrose Oral Gel 15 Gram(s) Oral once PRN  glucagon  Injectable 1 milliGRAM(s) IntraMuscular once  insulin lispro (ADMELOG) corrective regimen sliding scale   SubCutaneous Before meals and at bedtime  losartan 100 milliGRAM(s) Oral daily  sodium chloride 0.9%. 1000 milliLiter(s) IV Continuous <Continuous>  umeclidinium 62.5 MICROgram(s)/vilanterol 25 MICROgram(s) Inhaler 1 Puff(s) Inhalation daily    REVIEW OF SYSTEMS  Constitutional:  Negative fever, chills or loss of appetite.  Eyes:  Negative blurry vision or double vision.  Cardiovascular:  Negative for chest pain or palpitations.  Respiratory:  Negative for cough, wheezing, or shortness of breath.   Gastrointestinal:  Negative for nausea, vomiting, diarrhea, constipation, or abdominal pain.  Genitourinary:  Negative frequency, urgency or dysuria.  Neurologic:  No headache, confusion, dizziness, lightheadedness.    PHYSICAL EXAM  Vital Signs Last 24 Hrs  T(C): 36.7 (19 Mar 2024 08:32), Max: 36.7 (18 Mar 2024 21:13)  T(F): 98.1 (19 Mar 2024 08:32), Max: 98.1 (19 Mar 2024 08:32)  HR: 89 (19 Mar 2024 11:03) (83 - 92)  BP: 131/58 (19 Mar 2024 11:03) (117/59 - 148/63)  BP(mean): 86 (19 Mar 2024 06:11) (86 - 94)  RR: 16 (19 Mar 2024 11:03) (16 - 17)  SpO2: 97% (19 Mar 2024 11:03) (95% - 99%)    Parameters below as of 19 Mar 2024 11:03  Patient On (Oxygen Delivery Method): room air    Constitutional: Awake, alert, in no acute distress.   HEENT: Normocephalic, atraumatic, PAVAN, no proptosis or lid retraction.   Neck: supple, no acanthosis, no thyromegaly or palpable thyroid nodules.  Respiratory: Lungs clear to ausculation bilaterally.   Cardiovascular: regular rhythm, normal S1 and S2, no audible murmurs.   GI: soft, non-tender, non-distended, bowel sounds present, no masses appreciated.  Extremities: No lower extremity edema, peripheral pulses present.   Skin: no rashes.   Psychiatric: AAO x 3. Normal affect/mood.     LABS  CBC - WBC/HGB/HTC/PLT: 7.11/14.5/44.8/140 (03-19-24)  BMP: Na/K/Cl/Bicarb/BUN/Cr/Gluc: 141/4.1/108/18/25/1.25/150 (03-19-24)  Anion Gap: 15 (03-19-24)  eGFR: 60 (03-19-24)  Calcium: 9.4 (03-19-24)  Phosphorus: -- (03-19-24)  Magnesium: 2.2 (03-19-24)  LFT - Alb/Tprot/Tbili/Dbili/AlkPhos/ALT/AST: 4.9/--/0.6/--/87/12/16 (03-18-24)  PT/aPTT/INR: 10.6/28.5/0.93 (03-18-24)       HISTORY OF PRESENT ILLNESS  79 yo M, former smoker (20 years), with PMHx HTN, HLD, DM2, CAD (per CCTA severe FFR+ LAD and RPL w/ borderline severe LCX FFR 0.91), CKD (outpatient sCr in [facility] 1.2), thrombocytopenia ( 2/2024) COPD, KEARA, presented to Dr Murray with c/o chest tightness, however denies GODDARD or exertional CP as well as dizziness, palpitations, orthopnea/PND, leg swelling, LOC, bleeding, melena/hematochezia. He recently had a URI but is now asymptomatic.  - CCTA 2/26/24: Ca score 998 (77%)m, w. FFR positive mLAD (0.72), dLAD (0.68), D1 (0.84), and RPL (0.68), w/ remaining segments FFR negative (pLCX 0.91, p-dRCA 0.94, RPDA and OM1 not visualized as small caliber vessels).  In light of risk factors, symptoms consisting of chest tightness, and abnormal CCTA w/ FFR positive disease, pt now presents for cardiac catheterization with possible intervention if clinically indicated.   3/18: s/p cardiac cath: cutting balloon/NU x 2 p/mLAD (90% diffuse stenosis), D1 mild stenosis. RCA mild dz, RPL 80% stenosis. LM minor dz. LCx minor dz. No AS. EDP 10.     Endocrine consulted for uncontrolled type 2 diabetes. Patient seen at bedside with daughter. He has been in prediabetic range for many years and is followed by endocrinologist Dr Ku, with visits every 3 months. He has been on Metformin 2000mg daily, farxiga 10 mg daily, januvia 100mg daily. Januvia was stopped 6 months ago for unknown reasons. 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 He know how to monitor glucose at home and has supplies, but declines to do so. A1C 3 months ago was 6.1%, now 11.4% with discontinuation of metformin.  He used steroid inhaler for a few weeks about 2 months ago and then switched to anoro inhaler. He denies any systemic or injectable steroids in past 3 months. He recently had URI, now resolved.  He has made dietary modifications over past couple of months and lost 10lbs intentionally. Breakfast - lettuce and 1 slice of bread. Lunch and dinner - protein and salad. Infrequent sweets. No soda or juice.    CAPILLARY BLOOD GLUCOSE & INSULIN RECEIVED  241 mg/dL (03-18 @ 17:01)  318 mg/dL (03-18 @ 21:57)  150 mg/dL (03-19 @ 05:30)  147 mg/dL (03-19 @ 08:02)      DIABETES HISTORY  - Age at diagnosis:   - Symptoms at time of diagnosis:   - Current Therapy:  - History of other regimens:   - History of hypoglycemia:   - History of DKA/HHS:   - Complications:   - Home FSG:        > Fasting: *** mg/dL.        > Before meals: *** mg/dL.        > Bedtime: *** mg/dL.  - Diet:          > Breakfast:         > Lunch:        > Dinner:        > Snacks:  - Physical activity:    - Outpatient follow-up:     PAST MEDICAL & SURGICAL HISTORY  As per history of present illness.     FAMILY HISTORY  - Diabetes:  - Thyroid:  - Autoimmune:  - Other:    SOCIAL HISTORY  - Work:  - Alcohol:  - Smoking:  - Recreational Drugs:    ALLERGIES  No Known Allergies    CURRENT MEDICATIONS  aspirin enteric coated 81 milliGRAM(s) Oral daily  atorvastatin 40 milliGRAM(s) Oral at bedtime  chlorhexidine 4% Liquid 1 Application(s) Topical once  clopidogrel Tablet 75 milliGRAM(s) Oral daily  dapagliflozin 10 milliGRAM(s) Oral every 24 hours  dextrose 5%. 1000 milliLiter(s) IV Continuous <Continuous>  dextrose 5%. 1000 milliLiter(s) IV Continuous <Continuous>  dextrose 50% Injectable 25 Gram(s) IV Push once  dextrose 50% Injectable 12.5 Gram(s) IV Push once  dextrose 50% Injectable 25 Gram(s) IV Push once  dextrose Oral Gel 15 Gram(s) Oral once PRN  glucagon  Injectable 1 milliGRAM(s) IntraMuscular once  insulin lispro (ADMELOG) corrective regimen sliding scale   SubCutaneous Before meals and at bedtime  losartan 100 milliGRAM(s) Oral daily  sodium chloride 0.9%. 1000 milliLiter(s) IV Continuous <Continuous>  umeclidinium 62.5 MICROgram(s)/vilanterol 25 MICROgram(s) Inhaler 1 Puff(s) Inhalation daily    REVIEW OF SYSTEMS  Constitutional:  Negative fever, chills or loss of appetite.  Eyes:  Negative blurry vision or double vision.  Cardiovascular:  Negative for chest pain or palpitations.  Respiratory:  Negative for cough, wheezing, or shortness of breath.   Gastrointestinal:  Negative for nausea, vomiting, diarrhea, constipation, or abdominal pain.  Genitourinary:  Negative frequency, urgency or dysuria.  Neurologic:  No headache, confusion, dizziness, lightheadedness.    PHYSICAL EXAM  Vital Signs Last 24 Hrs  T(C): 36.7 (19 Mar 2024 08:32), Max: 36.7 (18 Mar 2024 21:13)  T(F): 98.1 (19 Mar 2024 08:32), Max: 98.1 (19 Mar 2024 08:32)  HR: 89 (19 Mar 2024 11:03) (83 - 92)  BP: 131/58 (19 Mar 2024 11:03) (117/59 - 148/63)  BP(mean): 86 (19 Mar 2024 06:11) (86 - 94)  RR: 16 (19 Mar 2024 11:03) (16 - 17)  SpO2: 97% (19 Mar 2024 11:03) (95% - 99%)    Parameters below as of 19 Mar 2024 11:03  Patient On (Oxygen Delivery Method): room air    Constitutional: Awake, alert, in no acute distress.   HEENT: Normocephalic, atraumatic, PAVAN, no proptosis or lid retraction.   Neck: supple, no acanthosis, no thyromegaly or palpable thyroid nodules.  Respiratory: Lungs clear to ausculation bilaterally.   Cardiovascular: regular rhythm, normal S1 and S2, no audible murmurs.   GI: soft, non-tender, non-distended, bowel sounds present, no masses appreciated.  Extremities: No lower extremity edema, peripheral pulses present.   Skin: no rashes.   Psychiatric: AAO x 3. Normal affect/mood.     LABS  CBC - WBC/HGB/HTC/PLT: 7.11/14.5/44.8/140 (03-19-24)  BMP: Na/K/Cl/Bicarb/BUN/Cr/Gluc: 141/4.1/108/18/25/1.25/150 (03-19-24)  Anion Gap: 15 (03-19-24)  eGFR: 60 (03-19-24)  Calcium: 9.4 (03-19-24)  Phosphorus: -- (03-19-24)  Magnesium: 2.2 (03-19-24)  LFT - Alb/Tprot/Tbili/Dbili/AlkPhos/ALT/AST: 4.9/--/0.6/--/87/12/16 (03-18-24)  PT/aPTT/INR: 10.6/28.5/0.93 (03-18-24)       HISTORY OF PRESENT ILLNESS  77 yo M, former smoker (20 years), with PMHx HTN, HLD, DM2, CAD (per CCTA severe FFR+ LAD and RPL w/ borderline severe LCX FFR 0.91), CKD (outpatient sCr in [facility] 1.2), thrombocytopenia ( 2/2024) COPD, KEARA, presented to Dr Murray with c/o chest tightness, however denies GODDARD or exertional CP as well as dizziness, palpitations, orthopnea/PND, leg swelling, LOC, bleeding, melena/hematochezia. He recently had a URI but is now asymptomatic.  - CCTA 2/26/24: Ca score 998 (77%)m, w. FFR positive mLAD (0.72), dLAD (0.68), D1 (0.84), and RPL (0.68), w/ remaining segments FFR negative (pLCX 0.91, p-dRCA 0.94, RPDA and OM1 not visualized as small caliber vessels).  In light of risk factors, symptoms consisting of chest tightness, and abnormal CCTA w/ FFR positive disease, pt now presents for cardiac catheterization with possible intervention if clinically indicated.   3/18: s/p cardiac cath: cutting balloon/NU x 2 p/mLAD (90% diffuse stenosis), D1 mild stenosis. RCA mild dz, RPL 80% stenosis. LM minor dz. LCx minor dz. No AS. EDP 10.     Endocrine consulted for uncontrolled type 2 diabetes. Patient seen at bedside with daughter. He has been in prediabetic range for many years and is followed by endocrinologist Dr Ku, with visits every 3 months. He has been on Metformin 2000mg daily, farxiga 10 mg daily, januvia 100mg daily. Januvia was stopped 6 months ago for unknown reasons. 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 He knows how to monitor glucose at home and has supplies, but declines to do so. A1C 3 months ago was 6.1%, now 11.4% with discontinuation of metformin.  He used steroid inhaler for a few weeks about 2 months ago and then switched to anoro inhaler. He denies any systemic or injectable steroids in past 3 months. He recently had URI, now resolved.  He has made dietary modifications over past couple of months and lost 10lbs intentionally. Breakfast - lettuce and 1 slice of bread. Lunch and dinner - protein and salad. Infrequent sweets. No soda or juice.    CAPILLARY BLOOD GLUCOSE & INSULIN RECEIVED  241 mg/dL (03-18 @ 17:01)  318 mg/dL (03-18 @ 21:57) - Lispro 8  150 mg/dL (03-19 @ 05:30)  147 mg/dL (03-19 @ 08:02) - Ate breakfast burrito and fruit cup  342 mg/dL (03-19 @ lunch)    ALLERGIES  No Known Allergies    CURRENT MEDICATIONS  aspirin enteric coated 81 milliGRAM(s) Oral daily  atorvastatin 40 milliGRAM(s) Oral at bedtime  chlorhexidine 4% Liquid 1 Application(s) Topical once  clopidogrel Tablet 75 milliGRAM(s) Oral daily  dapagliflozin 10 milliGRAM(s) Oral every 24 hours  dextrose 5%. 1000 milliLiter(s) IV Continuous <Continuous>  dextrose 5%. 1000 milliLiter(s) IV Continuous <Continuous>  dextrose 50% Injectable 25 Gram(s) IV Push once  dextrose 50% Injectable 12.5 Gram(s) IV Push once  dextrose 50% Injectable 25 Gram(s) IV Push once  dextrose Oral Gel 15 Gram(s) Oral once PRN  glucagon  Injectable 1 milliGRAM(s) IntraMuscular once  insulin lispro (ADMELOG) corrective regimen sliding scale   SubCutaneous Before meals and at bedtime  losartan 100 milliGRAM(s) Oral daily  sodium chloride 0.9%. 1000 milliLiter(s) IV Continuous <Continuous>  umeclidinium 62.5 MICROgram(s)/vilanterol 25 MICROgram(s) Inhaler 1 Puff(s) Inhalation daily    REVIEW OF SYSTEMS  Constitutional:  Negative fever, chills or loss of appetite.  Eyes:  Negative blurry vision or double vision.  Cardiovascular:  Negative for chest pain or palpitations.  Respiratory:  Negative for cough, wheezing, or shortness of breath.   Gastrointestinal:  Negative for nausea, vomiting, diarrhea, constipation, or abdominal pain.  Genitourinary:  Negative frequency, urgency or dysuria.  Neurologic:  No headache, confusion, dizziness, lightheadedness.    PHYSICAL EXAM  Vital Signs Last 24 Hrs  T(C): 36.7 (19 Mar 2024 08:32), Max: 36.7 (18 Mar 2024 21:13)  T(F): 98.1 (19 Mar 2024 08:32), Max: 98.1 (19 Mar 2024 08:32)  HR: 89 (19 Mar 2024 11:03) (83 - 92)  BP: 131/58 (19 Mar 2024 11:03) (117/59 - 148/63)  BP(mean): 86 (19 Mar 2024 06:11) (86 - 94)  RR: 16 (19 Mar 2024 11:03) (16 - 17)  SpO2: 97% (19 Mar 2024 11:03) (95% - 99%)    Parameters below as of 19 Mar 2024 11:03  Patient On (Oxygen Delivery Method): room air    Constitutional: Awake, alert, in no acute distress.   HEENT: Normocephalic, atraumatic, PAVAN, no proptosis or lid retraction.   Neck: supple, no acanthosis, no thyromegaly or palpable thyroid nodules.  Respiratory: Lungs clear to ausculation bilaterally.   Cardiovascular: regular rhythm, normal S1 and S2, no audible murmurs.   GI: soft, non-tender, non-distended, bowel sounds present, no masses appreciated.  Extremities: No lower extremity edema, peripheral pulses present.   Skin: no rashes.   Psychiatric: AAO x 3. Normal affect/mood.     LABS  CBC - WBC/HGB/HTC/PLT: 7.11/14.5/44.8/140 (03-19-24)  BMP: Na/K/Cl/Bicarb/BUN/Cr/Gluc: 141/4.1/108/18/25/1.25/150 (03-19-24)  Anion Gap: 15 (03-19-24)  eGFR: 60 (03-19-24)  Calcium: 9.4 (03-19-24)  Phosphorus: -- (03-19-24)  Magnesium: 2.2 (03-19-24)  LFT - Alb/Tprot/Tbili/Dbili/AlkPhos/ALT/AST: 4.9/--/0.6/--/87/12/16 (03-18-24)  PT/aPTT/INR: 10.6/28.5/0.93 (03-18-24)

## 2024-03-21 ENCOUNTER — APPOINTMENT (OUTPATIENT)
Dept: HEART AND VASCULAR | Facility: CLINIC | Age: 79
End: 2024-03-21

## 2024-03-23 DIAGNOSIS — Z90.49 ACQUIRED ABSENCE OF OTHER SPECIFIED PARTS OF DIGESTIVE TRACT: ICD-10-CM

## 2024-03-23 DIAGNOSIS — Z79.82 LONG TERM (CURRENT) USE OF ASPIRIN: ICD-10-CM

## 2024-03-23 DIAGNOSIS — G47.33 OBSTRUCTIVE SLEEP APNEA (ADULT) (PEDIATRIC): ICD-10-CM

## 2024-03-23 DIAGNOSIS — J44.9 CHRONIC OBSTRUCTIVE PULMONARY DISEASE, UNSPECIFIED: ICD-10-CM

## 2024-03-23 DIAGNOSIS — I25.10 ATHEROSCLEROTIC HEART DISEASE OF NATIVE CORONARY ARTERY WITHOUT ANGINA PECTORIS: ICD-10-CM

## 2024-03-23 DIAGNOSIS — Z79.84 LONG TERM (CURRENT) USE OF ORAL HYPOGLYCEMIC DRUGS: ICD-10-CM

## 2024-03-23 DIAGNOSIS — Z87.891 PERSONAL HISTORY OF NICOTINE DEPENDENCE: ICD-10-CM

## 2024-03-23 DIAGNOSIS — E78.5 HYPERLIPIDEMIA, UNSPECIFIED: ICD-10-CM

## 2024-03-23 DIAGNOSIS — D69.6 THROMBOCYTOPENIA, UNSPECIFIED: ICD-10-CM

## 2024-03-23 DIAGNOSIS — I12.9 HYPERTENSIVE CHRONIC KIDNEY DISEASE WITH STAGE 1 THROUGH STAGE 4 CHRONIC KIDNEY DISEASE, OR UNSPECIFIED CHRONIC KIDNEY DISEASE: ICD-10-CM

## 2024-03-23 DIAGNOSIS — I25.84 CORONARY ATHEROSCLEROSIS DUE TO CALCIFIED CORONARY LESION: ICD-10-CM

## 2024-03-23 DIAGNOSIS — N18.9 CHRONIC KIDNEY DISEASE, UNSPECIFIED: ICD-10-CM

## 2024-03-23 DIAGNOSIS — E11.22 TYPE 2 DIABETES MELLITUS WITH DIABETIC CHRONIC KIDNEY DISEASE: ICD-10-CM

## 2024-03-23 DIAGNOSIS — E11.65 TYPE 2 DIABETES MELLITUS WITH HYPERGLYCEMIA: ICD-10-CM

## 2024-03-27 ENCOUNTER — APPOINTMENT (OUTPATIENT)
Dept: HEART AND VASCULAR | Facility: CLINIC | Age: 79
End: 2024-03-27
Payer: MEDICARE

## 2024-03-27 VITALS
WEIGHT: 188 LBS | OXYGEN SATURATION: 95 % | HEART RATE: 87 BPM | DIASTOLIC BLOOD PRESSURE: 74 MMHG | SYSTOLIC BLOOD PRESSURE: 113 MMHG | BODY MASS INDEX: 32.1 KG/M2 | HEIGHT: 64 IN | TEMPERATURE: 97.7 F

## 2024-03-27 DIAGNOSIS — E11.69 TYPE 2 DIABETES MELLITUS WITH OTHER SPECIFIED COMPLICATION: ICD-10-CM

## 2024-03-27 PROCEDURE — G2211 COMPLEX E/M VISIT ADD ON: CPT

## 2024-03-27 PROCEDURE — 99215 OFFICE O/P EST HI 40 MIN: CPT

## 2024-03-27 RX ORDER — ETODOLAC 400 MG/1
400 TABLET, FILM COATED ORAL
Qty: 60 | Refills: 0 | Status: DISCONTINUED | COMMUNITY
Start: 2022-06-07 | End: 2024-03-27

## 2024-03-27 RX ORDER — METFORMIN HYDROCHLORIDE 625 MG/1
TABLET ORAL
Refills: 0 | Status: DISCONTINUED | COMMUNITY
End: 2024-03-27

## 2024-03-27 RX ORDER — ATORVASTATIN CALCIUM 80 MG/1
80 TABLET, FILM COATED ORAL DAILY
Qty: 90 | Refills: 3 | Status: ACTIVE | COMMUNITY
Start: 2024-03-27 | End: 1900-01-01

## 2024-03-27 RX ORDER — CHOLECALCIFEROL (VITAMIN D3) 25 MCG
TABLET ORAL
Refills: 0 | Status: DISCONTINUED | COMMUNITY
End: 2024-03-27

## 2024-03-27 RX ORDER — BLOOD SUGAR DIAGNOSTIC
STRIP MISCELLANEOUS
Qty: 50 | Refills: 0 | Status: ACTIVE | COMMUNITY
Start: 2020-08-04

## 2024-03-27 RX ORDER — ROSUVASTATIN CALCIUM 10 MG/1
10 TABLET, FILM COATED ORAL
Qty: 30 | Refills: 2 | Status: DISCONTINUED | COMMUNITY
Start: 2024-03-08 | End: 2024-03-27

## 2024-03-27 RX ORDER — METOPROLOL TARTRATE 50 MG/1
50 TABLET, FILM COATED ORAL
Qty: 3 | Refills: 0 | Status: DISCONTINUED | COMMUNITY
Start: 2024-02-14 | End: 2024-03-27

## 2024-03-27 RX ORDER — CYCLOBENZAPRINE HYDROCHLORIDE 5 MG/1
5 TABLET, FILM COATED ORAL
Qty: 30 | Refills: 0 | Status: DISCONTINUED | COMMUNITY
Start: 2022-06-07 | End: 2024-03-27

## 2024-03-27 RX ORDER — CLOPIDOGREL BISULFATE 75 MG/1
75 TABLET, FILM COATED ORAL DAILY
Qty: 90 | Refills: 2 | Status: ACTIVE | COMMUNITY
Start: 2024-03-27 | End: 1900-01-01

## 2024-03-27 RX ORDER — ASPIRIN 81 MG
81 TABLET,CHEWABLE ORAL
Refills: 0 | Status: ACTIVE | COMMUNITY

## 2024-03-27 RX ORDER — SEMAGLUTIDE 0.68 MG/ML
2 INJECTION, SOLUTION SUBCUTANEOUS
Qty: 1 | Refills: 0 | Status: ACTIVE | COMMUNITY
Start: 2024-03-27

## 2024-03-27 RX ORDER — OXYCODONE AND ACETAMINOPHEN 10; 325 MG/1; MG/1
10-325 TABLET ORAL
Qty: 21 | Refills: 0 | Status: DISCONTINUED | COMMUNITY
Start: 2020-09-21 | End: 2024-03-27

## 2024-03-27 RX ORDER — OLMESARTAN MEDOXOMIL 40 MG/1
40 TABLET, FILM COATED ORAL
Refills: 0 | Status: ACTIVE | COMMUNITY
Start: 2021-01-05

## 2024-03-27 RX ORDER — SITAGLIPTIN 100 MG/1
100 TABLET, FILM COATED ORAL
Refills: 0 | Status: DISCONTINUED | COMMUNITY
End: 2024-03-27

## 2024-03-27 RX ORDER — BLOOD-GLUCOSE METER
W/DEVICE EACH MISCELLANEOUS
Qty: 1 | Refills: 0 | Status: ACTIVE | COMMUNITY
Start: 2020-08-04

## 2024-03-27 RX ORDER — DAPAGLIFLOZIN 10 MG/1
TABLET, FILM COATED ORAL
Refills: 0 | Status: ACTIVE | COMMUNITY

## 2024-03-27 RX ORDER — OXYBUTYNIN CHLORIDE 2.5 MG/1
TABLET ORAL
Refills: 0 | Status: DISCONTINUED | COMMUNITY
End: 2024-03-27

## 2024-03-27 RX ORDER — VALACYCLOVIR 1 G/1
1 TABLET, FILM COATED ORAL
Qty: 21 | Refills: 0 | Status: DISCONTINUED | COMMUNITY
Start: 2020-09-13 | End: 2024-03-27

## 2024-03-27 RX ORDER — NYSTATIN AND TRIAMCINOLONE ACETONIDE 100000; 1 MG/G; MG/G
100000-0.1 CREAM TOPICAL
Qty: 60 | Refills: 0 | Status: DISCONTINUED | COMMUNITY
Start: 2020-07-21 | End: 2024-03-27

## 2024-03-27 NOTE — PHYSICAL EXAM
[Well Developed] : well developed [Well Nourished] : well nourished [No Acute Distress] : no acute distress [Normal Conjunctiva] : normal conjunctiva [Normal Venous Pressure] : normal venous pressure [No Carotid Bruit] : no carotid bruit [Normal S1, S2] : normal S1, S2 [No Rub] : no rub [No Murmur] : no murmur [No Gallop] : no gallop [Clear Lung Fields] : clear lung fields [Good Air Entry] : good air entry [No Respiratory Distress] : no respiratory distress  [Soft] : abdomen soft [Non Tender] : non-tender [No Masses/organomegaly] : no masses/organomegaly [Normal Bowel Sounds] : normal bowel sounds [Normal Gait] : normal gait [No Edema] : no edema [No Cyanosis] : no cyanosis [No Clubbing] : no clubbing [No Rash] : no rash [No Skin Lesions] : no skin lesions [Moves all extremities] : moves all extremities [Normal Speech] : normal speech [No Focal Deficits] : no focal deficits [Alert and Oriented] : alert and oriented [Normal memory] : normal memory [de-identified] : no echymosses  [de-identified] : groin puncture site clean, no palpable hematoma

## 2024-03-27 NOTE — HISTORY OF PRESENT ILLNESS
[FreeTextEntry1] : 78M w KEARA, HTN, preDMT2, BPH who was referred by Dr Smiley for chest tightness. Work up performed include a CCTA with significant CAD as noted above. He had a cath on 3/18 with NU x 2 to p-mLAD with plans of staged RPL PCI in 4-6 weeks.  3/27/24 FU: came in w dtr - compliant w DAPT asa plavix, atorva 40, olmesartan 40, farxiga, janumet 100 mg anoro ellipta - no exertional symptoms - no bleeding complications. groin access site no issues - following closely w endo for dm control (a1c >11)

## 2024-03-27 NOTE — REASON FOR VISIT
[Coronary Artery Disease] : coronary artery disease [FreeTextEntry1] :   CV Data: ECG 2/14/24: sinus tachy, inferior Q waves  CCTA 2/26/24: 988 AU Ca score w Severe stenosis of mid LAD, distal LAD, distal RCA, and RPL.  Moderate to severe stenosis of proximal RCA.  Moderate stenosis of proximal LCx, mid RCA and D1.  Calcific plaque obscures the lumen of OM1, and RPDA, precluding stenosis evaluation.  Remaining coronary segments are non-obstructive. LHC (3/18/24): PTCA/NU x2 p-mLAD; D1 mild stenosis, RCA mild stenosis, RPL 80% stenosis, LM minor disease, LCx minor disease; LVEDp 10mmHg. ACCESS: RFA w/ PC for hemostasis.  TTE 3/18/24: Normal biV systolic fxn. LVEF 60-65%. Mildly dilated RV. Aortic sclerosis.

## 2024-04-09 VITALS
TEMPERATURE: 98 F | DIASTOLIC BLOOD PRESSURE: 56 MMHG | HEART RATE: 86 BPM | OXYGEN SATURATION: 97 % | SYSTOLIC BLOOD PRESSURE: 106 MMHG | HEIGHT: 64 IN | WEIGHT: 192.02 LBS

## 2024-04-09 RX ORDER — ASPIRIN/CALCIUM CARB/MAGNESIUM 324 MG
81 TABLET ORAL DAILY
Refills: 0 | Status: DISCONTINUED | OUTPATIENT
Start: 2024-04-29 | End: 2024-05-13

## 2024-04-09 RX ORDER — CHLORHEXIDINE GLUCONATE 213 G/1000ML
1 SOLUTION TOPICAL ONCE
Refills: 0 | Status: DISCONTINUED | OUTPATIENT
Start: 2024-04-29 | End: 2024-05-13

## 2024-04-09 RX ORDER — CLOPIDOGREL BISULFATE 75 MG/1
75 TABLET, FILM COATED ORAL DAILY
Refills: 0 | Status: DISCONTINUED | OUTPATIENT
Start: 2024-04-29 | End: 2024-05-13

## 2024-04-09 NOTE — H&P ADULT - NSHPLABSRESULTS_GEN_ALL_CORE
13.9   6.86  )-----------( 148      ( 2024 07:03 )             43.1           142  |  108  |  21  ----------------------------<  140<H>  4.0   |  x   |  1.16    Ca    9.6      2024 07:03  Mg     2.1     -        PT/INR - ( 2024 07:03 )   PT: 11.8 sec;   INR: 1.04          PTT - ( 2024 07:03 )  PTT:29.8 sec          Urinalysis Basic - ( 2024 07:03 )    Color: x / Appearance: x / SG: x / pH: x  Gluc: 140 mg/dL / Ketone: x  / Bili: x / Urobili: x   Blood: x / Protein: x / Nitrite: x   Leuk Esterase: x / RBC: x / WBC x   Sq Epi: x / Non Sq Epi: x / Bacteria: x        EKst degree AV block, low voltage QRS

## 2024-04-09 NOTE — H&P ADULT - HISTORY OF PRESENT ILLNESS
Pharmacy: [facility] ([phone])   Cardiologist: Dr Murray  Escort: Daughter       79 yo M, former smoker (20 years), with PMHx HTN, HLD, DM2, CAD (per CCTA severe FFR+ LAD and RPL w/ borderline severe LCX FFR 0.91), CKD (outpatient sCr in allscripts 1.2), thrombocytopenia ( 2/2024) COPD, KEARA, presented to Dr Murray with c/o chest tightness, however denies GODDARD or exertional CP as well as dizziness, palpitations, orthopnea/PND, leg swelling, LOC, bleeding, melena/hematochezia. Pt reports to be feeling XXX since last cath and endorses ____compliance with DAPT.    Past Testing:  - CCTA 2/26/24: Ca score 998 (77%)m, w. FFR positive mLAD (0.72), dLAD (0.68), D1 (0.84), and RPL (0.68), w/ remaining segments FFR negative (pLCX 0.91, p-dRCA 0.94, RPDA and OM1 not visualized as small caliber vessels).  - LH (3/18/24): PTCA/NU x2 p-mLAD; D1 mild stenosis, RCA mild stenosis, RPL 80% stenosis, LM minor disease, LCx minor disease; LVEDp 10mmHg. ACCESS: RFA w/ PC for hemostasis.     In light of risk factors, symptoms consisting of chest tightness, and abnormal prior cath,  pt now presents for staged PCI of RPL lesion.   Pharmacy: [facility] ([phone])   Cardiologist: Dr Murray  Escort: Daughter       77 yo M, former smoker (20 years), with PMHx HTN, HLD, DM2, CAD (per CCTA severe FFR+ LAD and RPL w/ borderline severe LCX FFR 0.91), CKD (outpatient sCr in allscripts 1.2), thrombocytopenia ( 2/2024) COPD, KEARA, presented to Dr Murray with c/o chest tightness, however denies GODDARD or exertional CP as well as dizziness, palpitations, orthopnea/PND, leg swelling, LOC, bleeding, melena/hematochezia. Pt endorses compliance with DAPT.    Past Testing:  - CCTA 2/26/24: Ca score 998 (77%)m, w. FFR positive mLAD (0.72), dLAD (0.68), D1 (0.84), and RPL (0.68), w/ remaining segments FFR negative (pLCX 0.91, p-dRCA 0.94, RPDA and OM1 not visualized as small caliber vessels).  - [facility] (3/18/24): PTCA/NU x2 p-mLAD; D1 mild stenosis, RCA mild stenosis, RPL 80% stenosis, LM minor disease, LCx minor disease; LVEDp 10mmHg. ACCESS: RFA w/ PC for hemostasis.     In light of risk factors, symptoms consisting of chest tightness, and abnormal prior cath,  pt now presents for staged PCI of RPL lesion.

## 2024-04-09 NOTE — H&P ADULT - ASSESSMENT
79 yo M, former smoker (20 years), with PMHx HTN, HLD, DM2, CAD (per CCTA severe FFR+ LAD and RPL w/ borderline severe LCX FFR 0.91), CKD (outpatient sCr in allscripts 1.2), thrombocytopenia ( 2024) COPD, KEARA, presented to Dr Murray with c/o chest tightness, however denies GODDARD or exertional CP as well as dizziness, palpitations, orthopnea/PND, leg swelling, LOC, bleeding, melena/hematochezia. In light of risk factors, symptoms consisting of chest tightness, and abnormal prior cath,  pt now presents for staged PCI of RPL lesion.    EKst degree AV block, low voltage QRS		  ASA: 	III  Mallampati class: II    -No Known Allergies    -H/H = 13.9/43.1  . Pt denies BRBPR, hematuria, hematochezia, melena. Pt endorses compliance w/ home Aspirin 81 daily and Plavix 75 daily, stating last dose was 24. Pt loaded w/ ASA 81 mg x1 and Plavix 75 mg x1  -BUN/Cr = 21/1.16  . EF unknown. EDP 10. Euvolemic on exam. IV NS @ 250 cc bolus followed by 75  cc/hr x 2 hrs started pre procedure    Sedation Plan:   Moderate  Patient Is Suitable Candidate For Sedation?     Yes    Risks & benefits of procedure and alternative therapy have been explained to the patient including but not limited to: allergic reaction, bleeding with possible need for blood transfusion, infection, renal and vascular compromise, limb damage, arrhythmia, stroke, vessel dissection/perforation, myocardial infarction, and emergent CABG. Informed consent obtained at bedside and included in chart.

## 2024-04-29 ENCOUNTER — OUTPATIENT (OUTPATIENT)
Dept: OUTPATIENT SERVICES | Facility: HOSPITAL | Age: 79
LOS: 1 days | Discharge: HOME | End: 2024-04-29
Payer: MEDICARE

## 2024-04-29 DIAGNOSIS — Z90.49 ACQUIRED ABSENCE OF OTHER SPECIFIED PARTS OF DIGESTIVE TRACT: Chronic | ICD-10-CM

## 2024-04-29 LAB
A1C WITH ESTIMATED AVERAGE GLUCOSE RESULT: 9.5 % — HIGH (ref 4–5.6)
ALBUMIN SERPL ELPH-MCNC: 4.1 G/DL — SIGNIFICANT CHANGE UP (ref 3.3–5)
ALBUMIN SERPL ELPH-MCNC: 4.5 G/DL — SIGNIFICANT CHANGE UP (ref 3.3–5)
ALP SERPL-CCNC: 69 U/L — SIGNIFICANT CHANGE UP (ref 40–120)
ALP SERPL-CCNC: 75 U/L — SIGNIFICANT CHANGE UP (ref 40–120)
ALT FLD-CCNC: 15 U/L — SIGNIFICANT CHANGE UP (ref 10–45)
ALT FLD-CCNC: 17 U/L — SIGNIFICANT CHANGE UP (ref 10–45)
ANION GAP SERPL CALC-SCNC: 14 MMOL/L — SIGNIFICANT CHANGE UP (ref 5–17)
ANION GAP SERPL CALC-SCNC: 9 MMOL/L — SIGNIFICANT CHANGE UP (ref 5–17)
APTT BLD: 29.8 SEC — SIGNIFICANT CHANGE UP (ref 24.5–35.6)
AST SERPL-CCNC: 14 U/L — SIGNIFICANT CHANGE UP (ref 10–40)
AST SERPL-CCNC: 18 U/L — SIGNIFICANT CHANGE UP (ref 10–40)
BASOPHILS # BLD AUTO: 0.05 K/UL — SIGNIFICANT CHANGE UP (ref 0–0.2)
BASOPHILS NFR BLD AUTO: 0.7 % — SIGNIFICANT CHANGE UP (ref 0–2)
BILIRUB SERPL-MCNC: 0.6 MG/DL — SIGNIFICANT CHANGE UP (ref 0.2–1.2)
BILIRUB SERPL-MCNC: 0.7 MG/DL — SIGNIFICANT CHANGE UP (ref 0.2–1.2)
BUN SERPL-MCNC: 21 MG/DL — SIGNIFICANT CHANGE UP (ref 7–23)
BUN SERPL-MCNC: 21 MG/DL — SIGNIFICANT CHANGE UP (ref 7–23)
CALCIUM SERPL-MCNC: 8.8 MG/DL — SIGNIFICANT CHANGE UP (ref 8.4–10.5)
CALCIUM SERPL-MCNC: 9.6 MG/DL — SIGNIFICANT CHANGE UP (ref 8.4–10.5)
CHLORIDE SERPL-SCNC: 108 MMOL/L — SIGNIFICANT CHANGE UP (ref 96–108)
CHLORIDE SERPL-SCNC: 108 MMOL/L — SIGNIFICANT CHANGE UP (ref 96–108)
CHOLEST SERPL-MCNC: 94 MG/DL — SIGNIFICANT CHANGE UP
CK MB CFR SERPL CALC: <1 NG/ML — SIGNIFICANT CHANGE UP (ref 0–6.7)
CK SERPL-CCNC: 46 U/L — SIGNIFICANT CHANGE UP (ref 30–200)
CO2 SERPL-SCNC: 20 MMOL/L — LOW (ref 22–31)
CO2 SERPL-SCNC: 21 MMOL/L — LOW (ref 22–31)
CREAT SERPL-MCNC: 1.11 MG/DL — SIGNIFICANT CHANGE UP (ref 0.5–1.3)
CREAT SERPL-MCNC: 1.16 MG/DL — SIGNIFICANT CHANGE UP (ref 0.5–1.3)
EGFR: 65 ML/MIN/1.73M2 — SIGNIFICANT CHANGE UP
EGFR: 69 ML/MIN/1.73M2 — SIGNIFICANT CHANGE UP
EOSINOPHIL # BLD AUTO: 0.28 K/UL — SIGNIFICANT CHANGE UP (ref 0–0.5)
EOSINOPHIL NFR BLD AUTO: 4.1 % — SIGNIFICANT CHANGE UP (ref 0–6)
ESTIMATED AVERAGE GLUCOSE: 226 MG/DL — HIGH (ref 68–114)
GLUCOSE BLDC GLUCOMTR-MCNC: 113 MG/DL — HIGH (ref 70–99)
GLUCOSE BLDC GLUCOMTR-MCNC: 126 MG/DL — HIGH (ref 70–99)
GLUCOSE SERPL-MCNC: 140 MG/DL — HIGH (ref 70–99)
GLUCOSE SERPL-MCNC: 155 MG/DL — HIGH (ref 70–99)
HCT VFR BLD CALC: 40.1 % — SIGNIFICANT CHANGE UP (ref 39–50)
HCT VFR BLD CALC: 43.1 % — SIGNIFICANT CHANGE UP (ref 39–50)
HDLC SERPL-MCNC: 44 MG/DL — SIGNIFICANT CHANGE UP
HGB BLD-MCNC: 12.7 G/DL — LOW (ref 13–17)
HGB BLD-MCNC: 13.9 G/DL — SIGNIFICANT CHANGE UP (ref 13–17)
IMM GRANULOCYTES NFR BLD AUTO: 0.3 % — SIGNIFICANT CHANGE UP (ref 0–0.9)
INR BLD: 1.04 — SIGNIFICANT CHANGE UP (ref 0.85–1.18)
ISTAT ACTK (ACTIVATED CLOTTING TIME KAOLIN): 374 SEC — HIGH (ref 74–137)
LIPID PNL WITH DIRECT LDL SERPL: 32 MG/DL — SIGNIFICANT CHANGE UP
LYMPHOCYTES # BLD AUTO: 1.78 K/UL — SIGNIFICANT CHANGE UP (ref 1–3.3)
LYMPHOCYTES # BLD AUTO: 25.9 % — SIGNIFICANT CHANGE UP (ref 13–44)
MAGNESIUM SERPL-MCNC: 1.9 MG/DL — SIGNIFICANT CHANGE UP (ref 1.6–2.6)
MAGNESIUM SERPL-MCNC: 2.1 MG/DL — SIGNIFICANT CHANGE UP (ref 1.6–2.6)
MCHC RBC-ENTMCNC: 27.6 PG — SIGNIFICANT CHANGE UP (ref 27–34)
MCHC RBC-ENTMCNC: 27.7 PG — SIGNIFICANT CHANGE UP (ref 27–34)
MCHC RBC-ENTMCNC: 31.7 GM/DL — LOW (ref 32–36)
MCHC RBC-ENTMCNC: 32.3 GM/DL — SIGNIFICANT CHANGE UP (ref 32–36)
MCV RBC AUTO: 85.7 FL — SIGNIFICANT CHANGE UP (ref 80–100)
MCV RBC AUTO: 87.4 FL — SIGNIFICANT CHANGE UP (ref 80–100)
MONOCYTES # BLD AUTO: 0.5 K/UL — SIGNIFICANT CHANGE UP (ref 0–0.9)
MONOCYTES NFR BLD AUTO: 7.3 % — SIGNIFICANT CHANGE UP (ref 2–14)
NEUTROPHILS # BLD AUTO: 4.23 K/UL — SIGNIFICANT CHANGE UP (ref 1.8–7.4)
NEUTROPHILS NFR BLD AUTO: 61.7 % — SIGNIFICANT CHANGE UP (ref 43–77)
NON HDL CHOLESTEROL: 50 MG/DL — SIGNIFICANT CHANGE UP
NRBC # BLD: 0 /100 WBCS — SIGNIFICANT CHANGE UP (ref 0–0)
NRBC # BLD: 0 /100 WBCS — SIGNIFICANT CHANGE UP (ref 0–0)
PLATELET # BLD AUTO: 119 K/UL — LOW (ref 150–400)
PLATELET # BLD AUTO: 148 K/UL — LOW (ref 150–400)
POTASSIUM SERPL-MCNC: 3.9 MMOL/L — SIGNIFICANT CHANGE UP (ref 3.5–5.3)
POTASSIUM SERPL-MCNC: 4 MMOL/L — SIGNIFICANT CHANGE UP (ref 3.5–5.3)
POTASSIUM SERPL-SCNC: 3.9 MMOL/L — SIGNIFICANT CHANGE UP (ref 3.5–5.3)
POTASSIUM SERPL-SCNC: 4 MMOL/L — SIGNIFICANT CHANGE UP (ref 3.5–5.3)
PROT SERPL-MCNC: 6.7 G/DL — SIGNIFICANT CHANGE UP (ref 6–8.3)
PROT SERPL-MCNC: 7.4 G/DL — SIGNIFICANT CHANGE UP (ref 6–8.3)
PROTHROM AB SERPL-ACNC: 11.8 SEC — SIGNIFICANT CHANGE UP (ref 9.5–13)
RBC # BLD: 4.59 M/UL — SIGNIFICANT CHANGE UP (ref 4.2–5.8)
RBC # BLD: 5.03 M/UL — SIGNIFICANT CHANGE UP (ref 4.2–5.8)
RBC # FLD: 14.4 % — SIGNIFICANT CHANGE UP (ref 10.3–14.5)
RBC # FLD: 14.6 % — HIGH (ref 10.3–14.5)
SODIUM SERPL-SCNC: 138 MMOL/L — SIGNIFICANT CHANGE UP (ref 135–145)
SODIUM SERPL-SCNC: 142 MMOL/L — SIGNIFICANT CHANGE UP (ref 135–145)
TRIGL SERPL-MCNC: 95 MG/DL — SIGNIFICANT CHANGE UP
WBC # BLD: 5.65 K/UL — SIGNIFICANT CHANGE UP (ref 3.8–10.5)
WBC # BLD: 6.86 K/UL — SIGNIFICANT CHANGE UP (ref 3.8–10.5)
WBC # FLD AUTO: 5.65 K/UL — SIGNIFICANT CHANGE UP (ref 3.8–10.5)
WBC # FLD AUTO: 6.86 K/UL — SIGNIFICANT CHANGE UP (ref 3.8–10.5)

## 2024-04-29 PROCEDURE — 82550 ASSAY OF CK (CPK): CPT

## 2024-04-29 PROCEDURE — C1894: CPT

## 2024-04-29 PROCEDURE — C1887: CPT

## 2024-04-29 PROCEDURE — C1760: CPT

## 2024-04-29 PROCEDURE — C1725: CPT

## 2024-04-29 PROCEDURE — 85025 COMPLETE CBC W/AUTO DIFF WBC: CPT

## 2024-04-29 PROCEDURE — 99152 MOD SED SAME PHYS/QHP 5/>YRS: CPT

## 2024-04-29 PROCEDURE — 83735 ASSAY OF MAGNESIUM: CPT

## 2024-04-29 PROCEDURE — 93454 CORONARY ARTERY ANGIO S&I: CPT | Mod: 26,59

## 2024-04-29 PROCEDURE — 85347 COAGULATION TIME ACTIVATED: CPT

## 2024-04-29 PROCEDURE — 85610 PROTHROMBIN TIME: CPT

## 2024-04-29 PROCEDURE — 85730 THROMBOPLASTIN TIME PARTIAL: CPT

## 2024-04-29 PROCEDURE — 82553 CREATINE MB FRACTION: CPT

## 2024-04-29 PROCEDURE — 93454 CORONARY ARTERY ANGIO S&I: CPT | Mod: 59

## 2024-04-29 PROCEDURE — 36415 COLL VENOUS BLD VENIPUNCTURE: CPT

## 2024-04-29 PROCEDURE — C9600: CPT | Mod: RC

## 2024-04-29 PROCEDURE — 85027 COMPLETE CBC AUTOMATED: CPT

## 2024-04-29 PROCEDURE — 92928 PRQ TCAT PLMT NTRAC ST 1 LES: CPT | Mod: RC

## 2024-04-29 PROCEDURE — 82962 GLUCOSE BLOOD TEST: CPT

## 2024-04-29 PROCEDURE — 80061 LIPID PANEL: CPT

## 2024-04-29 PROCEDURE — C1874: CPT

## 2024-04-29 PROCEDURE — 93010 ELECTROCARDIOGRAM REPORT: CPT

## 2024-04-29 PROCEDURE — 93005 ELECTROCARDIOGRAM TRACING: CPT

## 2024-04-29 PROCEDURE — 83036 HEMOGLOBIN GLYCOSYLATED A1C: CPT

## 2024-04-29 PROCEDURE — 82565 ASSAY OF CREATININE: CPT

## 2024-04-29 PROCEDURE — C1769: CPT

## 2024-04-29 PROCEDURE — 80053 COMPREHEN METABOLIC PANEL: CPT

## 2024-04-29 RX ORDER — DEXTROSE 50 % IN WATER 50 %
15 SYRINGE (ML) INTRAVENOUS ONCE
Refills: 0 | Status: DISCONTINUED | OUTPATIENT
Start: 2024-04-29 | End: 2024-05-13

## 2024-04-29 RX ORDER — ASPIRIN/CALCIUM CARB/MAGNESIUM 324 MG
1 TABLET ORAL
Qty: 30 | Refills: 11
Start: 2024-04-29 | End: 2025-04-23

## 2024-04-29 RX ORDER — DEXTROSE 50 % IN WATER 50 %
25 SYRINGE (ML) INTRAVENOUS ONCE
Refills: 0 | Status: DISCONTINUED | OUTPATIENT
Start: 2024-04-29 | End: 2024-05-13

## 2024-04-29 RX ORDER — DEXTROSE 50 % IN WATER 50 %
12.5 SYRINGE (ML) INTRAVENOUS ONCE
Refills: 0 | Status: DISCONTINUED | OUTPATIENT
Start: 2024-04-29 | End: 2024-05-13

## 2024-04-29 RX ORDER — INSULIN LISPRO 100/ML
VIAL (ML) SUBCUTANEOUS
Refills: 0 | Status: DISCONTINUED | OUTPATIENT
Start: 2024-04-29 | End: 2024-05-13

## 2024-04-29 RX ORDER — SODIUM CHLORIDE 9 MG/ML
1000 INJECTION, SOLUTION INTRAVENOUS
Refills: 0 | Status: DISCONTINUED | OUTPATIENT
Start: 2024-04-29 | End: 2024-05-13

## 2024-04-29 RX ORDER — OLMESARTAN MEDOXOMIL 5 MG/1
1 TABLET, FILM COATED ORAL
Refills: 0 | DISCHARGE

## 2024-04-29 RX ORDER — UMECLIDINIUM BROMIDE AND VILANTEROL TRIFENATATE 62.5; 25 UG/1; UG/1
1 POWDER RESPIRATORY (INHALATION)
Refills: 0 | DISCHARGE

## 2024-04-29 RX ORDER — POTASSIUM CHLORIDE 20 MEQ
20 PACKET (EA) ORAL ONCE
Refills: 0 | Status: COMPLETED | OUTPATIENT
Start: 2024-04-29 | End: 2024-04-29

## 2024-04-29 RX ORDER — CLOPIDOGREL BISULFATE 75 MG/1
1 TABLET, FILM COATED ORAL
Qty: 30 | Refills: 11
Start: 2024-04-29 | End: 2025-04-23

## 2024-04-29 RX ORDER — SODIUM CHLORIDE 9 MG/ML
1000 INJECTION INTRAMUSCULAR; INTRAVENOUS; SUBCUTANEOUS
Refills: 0 | Status: DISCONTINUED | OUTPATIENT
Start: 2024-04-29 | End: 2024-04-29

## 2024-04-29 RX ORDER — MAGNESIUM OXIDE 400 MG ORAL TABLET 241.3 MG
400 TABLET ORAL ONCE
Refills: 0 | Status: COMPLETED | OUTPATIENT
Start: 2024-04-29 | End: 2024-04-29

## 2024-04-29 RX ORDER — SODIUM CHLORIDE 9 MG/ML
1000 INJECTION INTRAMUSCULAR; INTRAVENOUS; SUBCUTANEOUS
Refills: 0 | Status: DISCONTINUED | OUTPATIENT
Start: 2024-04-29 | End: 2024-05-13

## 2024-04-29 RX ORDER — GLUCAGON INJECTION, SOLUTION 0.5 MG/.1ML
1 INJECTION, SOLUTION SUBCUTANEOUS ONCE
Refills: 0 | Status: DISCONTINUED | OUTPATIENT
Start: 2024-04-29 | End: 2024-05-13

## 2024-04-29 RX ORDER — DAPAGLIFLOZIN 10 MG/1
1 TABLET, FILM COATED ORAL
Refills: 0 | DISCHARGE

## 2024-04-29 RX ORDER — DEXTROSE 10 % IN WATER 10 %
125 INTRAVENOUS SOLUTION INTRAVENOUS ONCE
Refills: 0 | Status: DISCONTINUED | OUTPATIENT
Start: 2024-04-29 | End: 2024-05-13

## 2024-04-29 RX ORDER — SODIUM CHLORIDE 9 MG/ML
250 INJECTION INTRAMUSCULAR; INTRAVENOUS; SUBCUTANEOUS ONCE
Refills: 0 | Status: COMPLETED | OUTPATIENT
Start: 2024-04-29 | End: 2024-04-29

## 2024-04-29 RX ADMIN — SODIUM CHLORIDE 75 MILLILITER(S): 9 INJECTION INTRAMUSCULAR; INTRAVENOUS; SUBCUTANEOUS at 07:43

## 2024-04-29 RX ADMIN — Medication 81 MILLIGRAM(S): at 07:50

## 2024-04-29 RX ADMIN — SODIUM CHLORIDE 1000 MILLILITER(S): 9 INJECTION INTRAMUSCULAR; INTRAVENOUS; SUBCUTANEOUS at 07:49

## 2024-04-29 RX ADMIN — Medication 20 MILLIEQUIVALENT(S): at 12:57

## 2024-04-29 RX ADMIN — MAGNESIUM OXIDE 400 MG ORAL TABLET 400 MILLIGRAM(S): 241.3 TABLET ORAL at 12:58

## 2024-04-29 RX ADMIN — SODIUM CHLORIDE 75 MILLILITER(S): 9 INJECTION INTRAMUSCULAR; INTRAVENOUS; SUBCUTANEOUS at 07:51

## 2024-04-29 RX ADMIN — CLOPIDOGREL BISULFATE 75 MILLIGRAM(S): 75 TABLET, FILM COATED ORAL at 07:49

## 2024-04-29 RX ADMIN — SODIUM CHLORIDE 170 MILLILITER(S): 9 INJECTION INTRAMUSCULAR; INTRAVENOUS; SUBCUTANEOUS at 09:57

## 2024-04-29 NOTE — PROGRESS NOTE ADULT - SUBJECTIVE AND OBJECTIVE BOX
Interventional Cardiology PA Post PCI SDA Discharge Note    Patient without complaints. Ambulated and voided without difficulties    Afebrile, VSS    Ext: Right Groin: no hematoma, no bruit, dressing; C/D/I  		  Pulses: intact DP/PT to baseline     A/P:  77 yo M, former smoker (20 years), with PMHx HTN, HLD, DM2, CAD, CKD (outpatient sCr in allscripts 1.2), thrombocytopenia ( 2/2024) COPD, KEARA, who now presents to [facility] for recommended staged PCI, in light of risk factors, symptoms consisting of chest tightness, and abnormal prior cath w/ known residual CAD. Pt now s/p PCI performed by Dr. Dupree and fellow Connor: NU x1 RPDA (90%). LM normal. LAD stent patent. LCx minor LI. RCA minor LI. No EDP.     1. Follow-up with PMD/Cardiologist Dr. Murray in 72 hours.  2. Post procedure labs/EKG reviewed and stable.    3. Pt given instructions on importance of taking antiplatelet medication.    4. Stable for discharge as per attending Dr. Dupree after bed rest, pt voids, groin stable and 30 minutes of ambulation.  5. Prescriptions for Aspirin/Plavix e-prescribed and submitted to patient's pharmacy.    6. Patient will continue atorvastatin 80 mg once a day at bedtime.    7. Discharge forms signed and copies in chart    Interventional Cardiology PA Post PCI SDA Discharge Note    Patient without complaints. Ambulated and voided without difficulties    Afebrile, VSS    Ext: Right Groin: no hematoma, no bruit, dressing; C/D/I  		  Pulses: intact DP/PT to baseline     A/P:  79 yo M, former smoker (20 years), with PMHx HTN, HLD, DM2, CAD, CKD (outpatient sCr in allscripts 1.2), thrombocytopenia ( 2/2024) COPD, KEARA, who now presents to [facility] for recommended staged PCI, in light of risk factors, symptoms consisting of chest tightness, and abnormal prior cath w/ known residual CAD. Pt now s/p PCI performed by Dr. Dupree and fellow Connor: NU x1 RPDA (90%). LM normal. LAD stent patent. LCx minor LI. RCA minor LI. No EDP.     1. Follow-up with PMD/Cardiologist Dr. Murray in 72 hours.  2. Post procedure labs/EKG reviewed and stable.    3. Pt given instructions on importance of taking antiplatelet medication.    4. Stable for discharge as per attending Dr. Dupree after bed rest, pt voids, groin stable and 30 minutes of ambulation.  5. Prescriptions for Aspirin/Plavix e-prescribed and submitted to patient's pharmacy.    6. Patient will continue atorvastatin 40 mg once a day at bedtime. (LDL 32)  7. Discharge forms signed and copies in chart    Interventional Cardiology PA Post PCI SDA Discharge Note    Patient without complaints. Ambulated and voided without difficulties    Afebrile, VSS    Ext: Right Groin: no hematoma, no bruit, dressing; C/D/I  		  Pulses: intact DP/PT to baseline     A/P:  77 yo M, former smoker (20 years), with PMHx HTN, HLD, DM2, CAD (s/p C (3/18/24): PTCA/NU x2 p-mLAD w/ residual dz to RPDA), CKD (outpatient sCr in allscripts 1.2), thrombocytopenia ( 2/2024) COPD, KEARA, who now presents to [facility] for recommended staged PCI, in light of risk factors, symptoms consisting of chest tightness, and abnormal prior cath w/ known residual CAD. Pt now s/p PCI performed by Dr. Dupree and fellow Connor: NU x1 RPDA (90%). LM normal. LAD stent patent. LCx minor LI. RCA minor LI. No EDP.     1. Follow-up with PMD/Cardiologist Dr. Murray in 72 hours.  2. Post procedure labs/EKG reviewed and stable.    3. Pt given instructions on importance of taking antiplatelet medication.    4. Stable for discharge as per attending Dr. Dupree after bed rest, pt voids, groin stable and 30 minutes of ambulation.  5. Prescriptions for Aspirin/Plavix e-prescribed and submitted to patient's pharmacy.    6. Patient will continue atorvastatin 40 mg once a day at bedtime. (LDL 32)  7. Discharge forms signed and copies in chart

## 2024-04-30 LAB
ISTAT INR: 1.1 — SIGNIFICANT CHANGE UP (ref 0.88–1.16)
ISTAT PT: 12.9 SEC — SIGNIFICANT CHANGE UP (ref 10–12.9)
POCT ISTAT CREATININE: 1.2 MG/DL — SIGNIFICANT CHANGE UP (ref 0.5–1.3)

## 2024-05-07 ENCOUNTER — TRANSCRIPTION ENCOUNTER (OUTPATIENT)
Age: 79
End: 2024-05-07

## 2024-05-07 DIAGNOSIS — I25.10 ATHEROSCLEROTIC HEART DISEASE OF NATIVE CORONARY ARTERY WITHOUT ANGINA PECTORIS: ICD-10-CM

## 2024-05-07 DIAGNOSIS — Z95.5 PRESENCE OF CORONARY ANGIOPLASTY IMPLANT AND GRAFT: ICD-10-CM

## 2024-05-22 ENCOUNTER — APPOINTMENT (OUTPATIENT)
Dept: HEART AND VASCULAR | Facility: CLINIC | Age: 79
End: 2024-05-22
Payer: MEDICARE

## 2024-05-22 VITALS
OXYGEN SATURATION: 95 % | HEIGHT: 64 IN | DIASTOLIC BLOOD PRESSURE: 73 MMHG | WEIGHT: 188.38 LBS | BODY MASS INDEX: 32.16 KG/M2 | TEMPERATURE: 98 F | SYSTOLIC BLOOD PRESSURE: 110 MMHG | HEART RATE: 105 BPM

## 2024-05-22 DIAGNOSIS — I10 ESSENTIAL (PRIMARY) HYPERTENSION: ICD-10-CM

## 2024-05-22 DIAGNOSIS — E78.5 HYPERLIPIDEMIA, UNSPECIFIED: ICD-10-CM

## 2024-05-22 DIAGNOSIS — I25.10 ATHEROSCLEROTIC HEART DISEASE OF NATIVE CORONARY ARTERY W/OUT ANGINA PECTORIS: ICD-10-CM

## 2024-05-22 PROCEDURE — 99214 OFFICE O/P EST MOD 30 MIN: CPT

## 2024-05-22 PROCEDURE — G2211 COMPLEX E/M VISIT ADD ON: CPT

## 2024-05-22 NOTE — PHYSICAL EXAM
[Well Developed] : well developed [Well Nourished] : well nourished [No Acute Distress] : no acute distress [Normal Conjunctiva] : normal conjunctiva [Normal Venous Pressure] : normal venous pressure [No Carotid Bruit] : no carotid bruit [Normal S1, S2] : normal S1, S2 [No Murmur] : no murmur [No Rub] : no rub [No Gallop] : no gallop [Clear Lung Fields] : clear lung fields [Good Air Entry] : good air entry [No Respiratory Distress] : no respiratory distress  [Soft] : abdomen soft [Non Tender] : non-tender [No Masses/organomegaly] : no masses/organomegaly [Normal Bowel Sounds] : normal bowel sounds [Normal Gait] : normal gait [No Edema] : no edema [No Cyanosis] : no cyanosis [No Clubbing] : no clubbing [No Rash] : no rash [No Skin Lesions] : no skin lesions [Moves all extremities] : moves all extremities [No Focal Deficits] : no focal deficits [Normal Speech] : normal speech [Alert and Oriented] : alert and oriented [Normal memory] : normal memory [de-identified] : no echymosses  [de-identified] : groin puncture site clean, no palpable hematoma

## 2024-05-22 NOTE — REASON FOR VISIT
[Coronary Artery Disease] : coronary artery disease [FreeTextEntry1] :   CV Data: ECG 2/14/24: sinus tachy, inferior Q waves  CCTA 2/26/24: 988 AU Ca score w Severe stenosis of mid LAD, distal LAD, distal RCA, and RPL.  Moderate to severe stenosis of proximal RCA.  Moderate stenosis of proximal LCx, mid RCA and D1.  Calcific plaque obscures the lumen of OM1, and RPDA, precluding stenosis evaluation.  Remaining coronary segments are non-obstructive. LHC (3/18/24): PTCA/NU x2 p-mLAD; D1 mild stenosis, RCA mild stenosis, RPL 80% stenosis, LM minor disease, LCx minor disease; LVEDp 10mmHg. ACCESS: RFA w/ PC for hemostasis.  TTE 3/18/24: Normal biV systolic fxn. LVEF 60-65%. Mildly dilated RV. Aortic sclerosis.  [facility] (4/29/24) : staged PCI of PDA w 2.75 x 26 mm NU, 3.0 balloon, patent LAD stents LDL 4/29/24: 39

## 2024-05-22 NOTE — HISTORY OF PRESENT ILLNESS
[FreeTextEntry1] : 78M w KEARA, HTN, preDMT2, BPH who was referred by Dr Smiley for chest tightness. Work up performed include a CCTA with significant CAD as noted above. He had a cath on 3/18 with NU x 2 to p-mLAD with plans of staged RPL PCI in 4-6 weeks.  3/27/24 FU: came in w dtr - compliant w DAPT asa plavix, atorva 40, olmesartan 40, farxiga, janumet 100 mg anoro ellipta - no exertional symptoms - no bleeding complications. groin access site no issues - following closely w endo for dm control (a1c >11)  5/22/24 FU:  - no new complaints, no bleeding - no hematoma in groin site of puncture - no more chest discomfort

## 2024-06-11 ENCOUNTER — RX RENEWAL (OUTPATIENT)
Age: 79
End: 2024-06-11

## 2024-06-15 ENCOUNTER — RX RENEWAL (OUTPATIENT)
Age: 79
End: 2024-06-15

## 2024-06-15 RX ORDER — UMECLIDINIUM BROMIDE AND VILANTEROL TRIFENATATE 62.5; 25 UG/1; UG/1
62.5-25 POWDER RESPIRATORY (INHALATION) DAILY
Qty: 60 | Refills: 0 | Status: ACTIVE | COMMUNITY
Start: 2024-02-06 | End: 1900-01-01

## 2024-06-17 ENCOUNTER — APPOINTMENT (OUTPATIENT)
Dept: OPHTHALMOLOGY | Facility: CLINIC | Age: 79
End: 2024-06-17

## 2024-06-17 ENCOUNTER — NON-APPOINTMENT (OUTPATIENT)
Age: 79
End: 2024-06-17

## 2024-06-26 ENCOUNTER — TRANSCRIPTION ENCOUNTER (OUTPATIENT)
Age: 79
End: 2024-06-26

## 2024-06-26 ENCOUNTER — NON-APPOINTMENT (OUTPATIENT)
Age: 79
End: 2024-06-26

## 2024-11-18 ENCOUNTER — APPOINTMENT (OUTPATIENT)
Dept: HEART AND VASCULAR | Facility: CLINIC | Age: 79
End: 2024-11-18
Payer: MEDICARE

## 2024-11-18 PROCEDURE — 36415 COLL VENOUS BLD VENIPUNCTURE: CPT

## 2024-11-19 LAB
ALBUMIN SERPL ELPH-MCNC: 4.5 G/DL
ALP BLD-CCNC: 72 U/L
ALT SERPL-CCNC: 23 U/L
ANION GAP SERPL CALC-SCNC: 15 MMOL/L
AST SERPL-CCNC: 21 U/L
BILIRUB SERPL-MCNC: 1.1 MG/DL
BUN SERPL-MCNC: 24 MG/DL
CALCIUM SERPL-MCNC: 9.1 MG/DL
CHLORIDE SERPL-SCNC: 104 MMOL/L
CHOLEST SERPL-MCNC: 102 MG/DL
CO2 SERPL-SCNC: 22 MMOL/L
CREAT SERPL-MCNC: 1.26 MG/DL
EGFR: 59 ML/MIN/1.73M2
ESTIMATED AVERAGE GLUCOSE: 160 MG/DL
GLUCOSE SERPL-MCNC: 73 MG/DL
HBA1C MFR BLD HPLC: 7.2 %
HCT VFR BLD CALC: 47.3 %
HDLC SERPL-MCNC: 44 MG/DL
HGB BLD-MCNC: 14.8 G/DL
LDLC SERPL CALC-MCNC: 39 MG/DL
MCHC RBC-ENTMCNC: 27.7 PG
MCHC RBC-ENTMCNC: 31.3 G/DL
MCV RBC AUTO: 88.6 FL
NONHDLC SERPL-MCNC: 58 MG/DL
PLATELET # BLD AUTO: 151 K/UL
POTASSIUM SERPL-SCNC: 4.5 MMOL/L
PROT SERPL-MCNC: 7.2 G/DL
RBC # BLD: 5.34 M/UL
RBC # FLD: 14.8 %
SODIUM SERPL-SCNC: 141 MMOL/L
TRIGL SERPL-MCNC: 103 MG/DL
WBC # FLD AUTO: 6.7 K/UL

## 2024-11-22 ENCOUNTER — APPOINTMENT (OUTPATIENT)
Dept: HEART AND VASCULAR | Facility: CLINIC | Age: 79
End: 2024-11-22
Payer: MEDICARE

## 2024-11-22 ENCOUNTER — NON-APPOINTMENT (OUTPATIENT)
Age: 79
End: 2024-11-22

## 2024-11-22 VITALS
WEIGHT: 196.25 LBS | OXYGEN SATURATION: 98 % | SYSTOLIC BLOOD PRESSURE: 134 MMHG | DIASTOLIC BLOOD PRESSURE: 82 MMHG | HEART RATE: 87 BPM | TEMPERATURE: 98.1 F | HEIGHT: 64 IN | BODY MASS INDEX: 33.51 KG/M2

## 2024-11-22 DIAGNOSIS — E78.5 HYPERLIPIDEMIA, UNSPECIFIED: ICD-10-CM

## 2024-11-22 DIAGNOSIS — I10 ESSENTIAL (PRIMARY) HYPERTENSION: ICD-10-CM

## 2024-11-22 DIAGNOSIS — R07.89 OTHER CHEST PAIN: ICD-10-CM

## 2024-11-22 DIAGNOSIS — I25.10 ATHEROSCLEROTIC HEART DISEASE OF NATIVE CORONARY ARTERY W/OUT ANGINA PECTORIS: ICD-10-CM

## 2024-11-22 PROCEDURE — 93000 ELECTROCARDIOGRAM COMPLETE: CPT

## 2024-11-22 PROCEDURE — 99213 OFFICE O/P EST LOW 20 MIN: CPT

## 2024-11-22 PROCEDURE — G2211 COMPLEX E/M VISIT ADD ON: CPT

## 2024-12-09 ENCOUNTER — APPOINTMENT (OUTPATIENT)
Dept: OPHTHALMOLOGY | Facility: CLINIC | Age: 79
End: 2024-12-09

## 2024-12-09 ENCOUNTER — NON-APPOINTMENT (OUTPATIENT)
Age: 79
End: 2024-12-09

## 2024-12-18 ENCOUNTER — APPOINTMENT (OUTPATIENT)
Dept: INTERNAL MEDICINE | Facility: CLINIC | Age: 79
End: 2024-12-18
Payer: MEDICARE

## 2024-12-18 VITALS
TEMPERATURE: 97.3 F | DIASTOLIC BLOOD PRESSURE: 62 MMHG | HEART RATE: 98 BPM | OXYGEN SATURATION: 95 % | HEIGHT: 64 IN | WEIGHT: 192 LBS | BODY MASS INDEX: 32.78 KG/M2 | SYSTOLIC BLOOD PRESSURE: 117 MMHG

## 2024-12-18 DIAGNOSIS — E11.69 TYPE 2 DIABETES MELLITUS WITH OTHER SPECIFIED COMPLICATION: ICD-10-CM

## 2024-12-18 DIAGNOSIS — I10 ESSENTIAL (PRIMARY) HYPERTENSION: ICD-10-CM

## 2024-12-18 DIAGNOSIS — I25.10 ATHEROSCLEROTIC HEART DISEASE OF NATIVE CORONARY ARTERY W/OUT ANGINA PECTORIS: ICD-10-CM

## 2024-12-18 DIAGNOSIS — E78.5 HYPERLIPIDEMIA, UNSPECIFIED: ICD-10-CM

## 2024-12-18 DIAGNOSIS — Z80.0 FAMILY HISTORY OF MALIGNANT NEOPLASM OF DIGESTIVE ORGANS: ICD-10-CM

## 2024-12-18 DIAGNOSIS — Z87.891 PERSONAL HISTORY OF NICOTINE DEPENDENCE: ICD-10-CM

## 2024-12-18 PROCEDURE — 99204 OFFICE O/P NEW MOD 45 MIN: CPT

## 2025-01-17 ENCOUNTER — TRANSCRIPTION ENCOUNTER (OUTPATIENT)
Age: 80
End: 2025-01-17

## 2025-03-10 ENCOUNTER — RX RENEWAL (OUTPATIENT)
Age: 80
End: 2025-03-10

## 2025-03-20 ENCOUNTER — RX RENEWAL (OUTPATIENT)
Age: 80
End: 2025-03-20

## 2025-06-13 ENCOUNTER — NON-APPOINTMENT (OUTPATIENT)
Age: 80
End: 2025-06-13

## 2025-06-18 ENCOUNTER — APPOINTMENT (OUTPATIENT)
Dept: HEART AND VASCULAR | Facility: CLINIC | Age: 80
End: 2025-06-18
Payer: MEDICARE

## 2025-06-18 VITALS
SYSTOLIC BLOOD PRESSURE: 119 MMHG | DIASTOLIC BLOOD PRESSURE: 73 MMHG | HEART RATE: 87 BPM | HEIGHT: 64 IN | WEIGHT: 201 LBS | OXYGEN SATURATION: 96 % | TEMPERATURE: 97.8 F | BODY MASS INDEX: 34.31 KG/M2

## 2025-06-18 PROCEDURE — 93000 ELECTROCARDIOGRAM COMPLETE: CPT

## 2025-06-18 PROCEDURE — G2211 COMPLEX E/M VISIT ADD ON: CPT

## 2025-06-18 PROCEDURE — 99214 OFFICE O/P EST MOD 30 MIN: CPT

## 2025-06-18 RX ORDER — BLOOD PRESSURE TEST KIT-LARGE
KIT MISCELLANEOUS
Qty: 1 | Refills: 0 | Status: ACTIVE | COMMUNITY
Start: 2025-06-18 | End: 1900-01-01

## 2025-06-19 ENCOUNTER — APPOINTMENT (OUTPATIENT)
Dept: OPHTHALMOLOGY | Facility: CLINIC | Age: 80
End: 2025-06-19

## 2025-06-19 ENCOUNTER — NON-APPOINTMENT (OUTPATIENT)
Age: 80
End: 2025-06-19

## 2025-06-23 ENCOUNTER — APPOINTMENT (OUTPATIENT)
Dept: INTERNAL MEDICINE | Facility: CLINIC | Age: 80
End: 2025-06-23
Payer: MEDICARE

## 2025-06-23 ENCOUNTER — OUTPATIENT (OUTPATIENT)
Dept: OUTPATIENT SERVICES | Facility: HOSPITAL | Age: 80
LOS: 1 days | End: 2025-06-23

## 2025-06-23 ENCOUNTER — APPOINTMENT (OUTPATIENT)
Dept: ULTRASOUND IMAGING | Facility: CLINIC | Age: 80
End: 2025-06-23
Payer: MEDICARE

## 2025-06-23 VITALS
BODY MASS INDEX: 33.8 KG/M2 | HEIGHT: 64 IN | DIASTOLIC BLOOD PRESSURE: 67 MMHG | WEIGHT: 198 LBS | TEMPERATURE: 96.2 F | OXYGEN SATURATION: 100 % | HEART RATE: 77 BPM | SYSTOLIC BLOOD PRESSURE: 103 MMHG

## 2025-06-23 DIAGNOSIS — Z90.49 ACQUIRED ABSENCE OF OTHER SPECIFIED PARTS OF DIGESTIVE TRACT: Chronic | ICD-10-CM

## 2025-06-23 PROCEDURE — G0439: CPT

## 2025-06-23 PROCEDURE — 76775 US EXAM ABDO BACK WALL LIM: CPT | Mod: 26

## 2025-06-23 PROCEDURE — 36415 COLL VENOUS BLD VENIPUNCTURE: CPT

## 2025-06-25 LAB
ALBUMIN SERPL ELPH-MCNC: 4.5 G/DL
ALP BLD-CCNC: 79 U/L
ALT SERPL-CCNC: 25 U/L
ANION GAP SERPL CALC-SCNC: 16 MMOL/L
APPEARANCE: CLEAR
AST SERPL-CCNC: 25 U/L
BACTERIA: NEGATIVE /HPF
BILIRUB SERPL-MCNC: 1.1 MG/DL
BILIRUBIN URINE: NEGATIVE
BLOOD URINE: NEGATIVE
BUN SERPL-MCNC: 22 MG/DL
CALCIUM SERPL-MCNC: 8.8 MG/DL
CAST: 2 /LPF
CHLORIDE SERPL-SCNC: 107 MMOL/L
CHOLEST SERPL-MCNC: 116 MG/DL
CO2 SERPL-SCNC: 18 MMOL/L
COLOR: YELLOW
CREAT SERPL-MCNC: 1.21 MG/DL
EGFRCR SERPLBLD CKD-EPI 2021: 62 ML/MIN/1.73M2
EPITHELIAL CELLS: 0 /HPF
ESTIMATED AVERAGE GLUCOSE: 157 MG/DL
GLUCOSE QUALITATIVE U: >=1000 MG/DL
GLUCOSE SERPL-MCNC: 135 MG/DL
HBA1C MFR BLD HPLC: 7.1 %
HCT VFR BLD CALC: 44.6 %
HDLC SERPL-MCNC: 52 MG/DL
HGB BLD-MCNC: 13.5 G/DL
KETONES URINE: NEGATIVE MG/DL
LDLC SERPL-MCNC: 50 MG/DL
LEUKOCYTE ESTERASE URINE: NEGATIVE
MCHC RBC-ENTMCNC: 26.8 PG
MCHC RBC-ENTMCNC: 30.3 G/DL
MCV RBC AUTO: 88.5 FL
MICROSCOPIC-UA: NORMAL
NITRITE URINE: NEGATIVE
NONHDLC SERPL-MCNC: 65 MG/DL
PH URINE: 5.5
PLATELET # BLD AUTO: 130 K/UL
POTASSIUM SERPL-SCNC: 4.4 MMOL/L
PROT SERPL-MCNC: 7 G/DL
PROTEIN URINE: 100 MG/DL
PSA FREE FLD-MCNC: 67 %
PSA FREE SERPL-MCNC: 0.5 NG/ML
PSA SERPL-MCNC: 0.75 NG/ML
RBC # BLD: 5.04 M/UL
RBC # FLD: 14.6 %
RED BLOOD CELLS URINE: 0 /HPF
SODIUM SERPL-SCNC: 141 MMOL/L
SPECIFIC GRAVITY URINE: 1.03
TRIGL SERPL-MCNC: 73 MG/DL
TSH SERPL-ACNC: 1.16 UIU/ML
UROBILINOGEN URINE: 0.2 MG/DL
WBC # FLD AUTO: 6.34 K/UL
WHITE BLOOD CELLS URINE: 0 /HPF

## 2025-07-15 ENCOUNTER — RX RENEWAL (OUTPATIENT)
Age: 80
End: 2025-07-15